# Patient Record
Sex: FEMALE | Race: WHITE | NOT HISPANIC OR LATINO | URBAN - METROPOLITAN AREA
[De-identification: names, ages, dates, MRNs, and addresses within clinical notes are randomized per-mention and may not be internally consistent; named-entity substitution may affect disease eponyms.]

---

## 2017-07-20 ENCOUNTER — EMERGENCY (EMERGENCY)
Facility: HOSPITAL | Age: 52
LOS: 0 days | Discharge: HOME | End: 2017-07-20
Attending: EMERGENCY MEDICINE | Admitting: EMERGENCY MEDICINE
Payer: COMMERCIAL

## 2017-07-20 VITALS — HEIGHT: 63 IN | WEIGHT: 132.94 LBS

## 2017-07-20 VITALS
RESPIRATION RATE: 18 BRPM | OXYGEN SATURATION: 100 % | HEART RATE: 80 BPM | SYSTOLIC BLOOD PRESSURE: 129 MMHG | DIASTOLIC BLOOD PRESSURE: 42 MMHG | TEMPERATURE: 99 F
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
## 2017-07-20 DIAGNOSIS — H57.8 OTHER SPECIFIED DISORDERS OF EYE AND ADNEXA: ICD-10-CM
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
## 2017-07-20 DIAGNOSIS — S00.11XA CONTUSION OF RIGHT EYELID AND PERIOCULAR AREA, INITIAL ENCOUNTER: ICD-10-CM

## 2017-07-20 PROCEDURE — 99283 EMERGENCY DEPT VISIT LOW MDM: CPT | Mod: 25

## 2017-07-20 RX ORDER — ERYTHROMYCIN BASE 5 MG/GRAM
1 OINTMENT (GRAM) OPHTHALMIC (EYE) ONCE
Qty: 0 | Refills: 0 | Status: DISCONTINUED | OUTPATIENT
Start: 2017-07-20 | End: 2017-07-20

## 2017-07-20 NOTE — ED STATDOCS - ATTENDING CONTRIBUTION TO CARE
Attending Contribution to Care: I, Arminda Magana, performed the initial face to face bedside interview with this patient regarding history of present illness, review of symptoms and relevant past medical, social and family history.  I completed an independent physical examination.  I was the initial provider who evaluated this patient. I have signed out the follow up of any pending tests (i.e. labs, radiological studies) to the ACP.  I have communicated the patient’s plan of care and disposition with the ACP.

## 2017-07-20 NOTE — ED ADULT TRIAGE NOTE - CHIEF COMPLAINT QUOTE
Patient reports pain to her right eye, denies trauma, negative for swelling , itching, discharge and redness. HX of HCL

## 2017-07-20 NOTE — ED STATDOCS - PROGRESS NOTE DETAILS
VA:  OD 20/25  OS 20/25 with corrective lenses. MICKEY Ravi:   Patient has been seen, evaluated and orders have been written by the attending in intake. Patient is stable.  I will follow up the results of orders written and I will continue to evaluate/observe the patient.  Christina Ravi PA-C

## 2017-07-20 NOTE — ED STATDOCS - OBJECTIVE STATEMENT
Pt. is a 51 yo F complaining of sudden onset of purple color to eyelid on right without known fall or injury.  Pt. states she was rubbing it and it seemed to get darker.  Pt. states her eye feels irritated and dry too.  No other complaints such as fever, headache, vomiting, or falls.  Denies change in vision such as double or blurry vision. No history of this happening in the past.

## 2017-07-20 NOTE — ED STATDOCS - MEDICAL DECISION MAKING DETAILS
Bruise likely on the soft tissue of eyelid, ointment to be applied in case of localized abrasion. Outpatient ophthalmology appt planned.

## 2017-07-28 PROBLEM — Z00.00 ENCOUNTER FOR PREVENTIVE HEALTH EXAMINATION: Status: ACTIVE | Noted: 2017-07-28

## 2017-11-08 ENCOUNTER — RESULT REVIEW (OUTPATIENT)
Age: 52
End: 2017-11-08

## 2018-10-31 ENCOUNTER — RESULT REVIEW (OUTPATIENT)
Age: 53
End: 2018-10-31

## 2018-11-19 ENCOUNTER — TRANSCRIPTION ENCOUNTER (OUTPATIENT)
Age: 53
End: 2018-11-19

## 2019-10-09 ENCOUNTER — RESULT REVIEW (OUTPATIENT)
Age: 54
End: 2019-10-09

## 2020-11-12 ENCOUNTER — RESULT REVIEW (OUTPATIENT)
Age: 55
End: 2020-11-12

## 2021-10-27 NOTE — ED STATDOCS - EYES, MLM
Breath Sounds equal & clear to percussion & auscultation, no accessory muscle use clear bilaterally.  Pupils equal, round, and reactive to light.  +punctate area of bruising of upper eyelid on the right.

## 2022-01-26 ENCOUNTER — NON-APPOINTMENT (OUTPATIENT)
Age: 57
End: 2022-01-26

## 2022-01-27 ENCOUNTER — APPOINTMENT (OUTPATIENT)
Dept: BREAST CENTER | Facility: CLINIC | Age: 57
End: 2022-01-27
Payer: COMMERCIAL

## 2022-01-27 VITALS
BODY MASS INDEX: 26.58 KG/M2 | DIASTOLIC BLOOD PRESSURE: 80 MMHG | HEART RATE: 77 BPM | WEIGHT: 150 LBS | HEIGHT: 63 IN | SYSTOLIC BLOOD PRESSURE: 131 MMHG

## 2022-01-27 DIAGNOSIS — Z80.8 FAMILY HISTORY OF MALIGNANT NEOPLASM OF OTHER ORGANS OR SYSTEMS: ICD-10-CM

## 2022-01-27 DIAGNOSIS — Z86.39 PERSONAL HISTORY OF OTHER ENDOCRINE, NUTRITIONAL AND METABOLIC DISEASE: ICD-10-CM

## 2022-01-27 DIAGNOSIS — Z80.7 FAMILY HISTORY OF OTHER MALIGNANT NEOPLASMS OF LYMPHOID, HEMATOPOIETIC AND RELATED TISSUES: ICD-10-CM

## 2022-01-27 DIAGNOSIS — Z78.9 OTHER SPECIFIED HEALTH STATUS: ICD-10-CM

## 2022-01-27 DIAGNOSIS — Z87.442 PERSONAL HISTORY OF URINARY CALCULI: ICD-10-CM

## 2022-01-27 DIAGNOSIS — Z87.42 PERSONAL HISTORY OF OTHER DISEASES OF THE FEMALE GENITAL TRACT: ICD-10-CM

## 2022-01-27 DIAGNOSIS — Z72.3 LACK OF PHYSICAL EXERCISE: ICD-10-CM

## 2022-01-27 DIAGNOSIS — Z80.6 FAMILY HISTORY OF LEUKEMIA: ICD-10-CM

## 2022-01-27 PROCEDURE — 99204 OFFICE O/P NEW MOD 45 MIN: CPT

## 2022-01-29 PROBLEM — Z80.8 FAMILY HISTORY OF MALIGNANT NEOPLASM OF BRAIN: Status: ACTIVE | Noted: 2022-01-27

## 2022-01-29 PROBLEM — Z87.42 HISTORY OF OVARIAN CYST: Status: RESOLVED | Noted: 2022-01-27 | Resolved: 2022-01-29

## 2022-01-29 PROBLEM — Z86.39 HISTORY OF HYPERCHOLESTEROLEMIA: Status: RESOLVED | Noted: 2022-01-27 | Resolved: 2022-01-29

## 2022-01-29 PROBLEM — Z72.3 DOES NOT EXERCISE: Status: ACTIVE | Noted: 2022-01-27

## 2022-01-29 PROBLEM — Z80.8 FAMILY HISTORY OF MALIGNANT NEOPLASM OF BONE: Status: ACTIVE | Noted: 2022-01-27

## 2022-01-29 PROBLEM — Z87.442 HISTORY OF KIDNEY STONES: Status: RESOLVED | Noted: 2022-01-27 | Resolved: 2022-01-29

## 2022-01-29 PROBLEM — Z80.6 FAMILY HISTORY OF LEUKEMIA: Status: ACTIVE | Noted: 2022-01-27

## 2022-01-29 PROBLEM — Z80.7 FAMILY HISTORY OF NON-HODGKIN'S LYMPHOMA: Status: ACTIVE | Noted: 2022-01-27

## 2022-01-29 PROBLEM — Z78.9 DOES NOT USE ILLICIT DRUGS: Status: ACTIVE | Noted: 2022-01-27

## 2022-01-29 RX ORDER — ZOLPIDEM TARTRATE 5 MG/1
5 TABLET, FILM COATED ORAL
Refills: 0 | Status: ACTIVE | COMMUNITY

## 2022-01-29 RX ORDER — ATORVASTATIN CALCIUM 20 MG/1
20 TABLET, FILM COATED ORAL
Refills: 0 | Status: ACTIVE | COMMUNITY

## 2022-01-29 NOTE — PHYSICAL EXAM
[Normocephalic] : normocephalic [Atraumatic] : atraumatic [Sclera nonicteric] : sclera nonicteric [No Supraclavicular Adenopathy] : no supraclavicular adenopathy [Clear to Auscultation Bilat] : clear to auscultation bilaterally [Normal Sinus Rhythm] : normal sinus rhythm [No Rubs] : no pericardial rub [Examined in the supine and seated position] : examined in the supine and seated position [Bra Size: ___] : Bra Size: [unfilled] [No dominant masses] : no dominant masses in right breast  [No dominant masses] : no dominant masses left breast [No Nipple Retraction] : no left nipple retraction [No Nipple Discharge] : no left nipple discharge [No Axillary Lymphadenopathy] : no left axillary lymphadenopathy [Soft] : abdomen soft [No Edema] : no edema [No Rashes] : no rashes [No Ulceration] : no ulceration

## 2022-01-29 NOTE — DATA REVIEWED
[FreeTextEntry1] : B/l mammogram 12/1/21\par - scattered fibroglandular density\par - BIRADS 1\par \par B/l complete US 12/1/21\par - R breast cyst\par - 0.5 cm nodule vs. complicated cyst in L breast 2:00 N5, new\par - BIRADS 3; 6 mo L US

## 2022-01-29 NOTE — PAST MEDICAL HISTORY
[Menarche Age ____] : age at menarche was [unfilled] [Total Preg ___] : G[unfilled] [Age At Live Birth ___] : Age at live birth: [unfilled] [FreeTextEntry8] : 1 mo

## 2022-01-29 NOTE — HISTORY OF PRESENT ILLNESS
[FreeTextEntry1] : Ms. Lira is a 56 year old woman who presents for a consultation for abnormal findings in her breast on imaging. She is asymptomatic. No palpable breast or axillary lumps, nipple discharge, or skin changes. \par \par Her family history is not significant for any breast cancer.

## 2022-01-29 NOTE — CONSULT LETTER
[Dear  ___] : Dear  [unfilled], [Consult Letter:] : I had the pleasure of evaluating your patient, [unfilled]. [Please see my note below.] : Please see my note below. [Consult Closing:] : Thank you very much for allowing me to participate in the care of this patient.  If you have any questions, please do not hesitate to contact me. [Sincerely,] : Sincerely, [FreeTextEntry3] : Delmis Levine MD FACS  [DrMilena  ___] : Dr. GERONIMO

## 2022-03-09 ENCOUNTER — RESULT REVIEW (OUTPATIENT)
Age: 57
End: 2022-03-09

## 2023-12-14 ENCOUNTER — APPOINTMENT (OUTPATIENT)
Dept: NEUROLOGY | Facility: CLINIC | Age: 58
End: 2023-12-14

## 2023-12-18 ENCOUNTER — APPOINTMENT (OUTPATIENT)
Dept: BREAST CENTER | Facility: CLINIC | Age: 58
End: 2023-12-18
Payer: COMMERCIAL

## 2023-12-18 VITALS
DIASTOLIC BLOOD PRESSURE: 84 MMHG | SYSTOLIC BLOOD PRESSURE: 146 MMHG | WEIGHT: 139 LBS | HEIGHT: 63 IN | BODY MASS INDEX: 24.63 KG/M2 | HEART RATE: 67 BPM

## 2023-12-18 DIAGNOSIS — R22.30 LOCALIZED SWELLING, MASS AND LUMP, UNSPECIFIED UPPER LIMB: ICD-10-CM

## 2023-12-18 PROCEDURE — 99214 OFFICE O/P EST MOD 30 MIN: CPT

## 2023-12-18 RX ORDER — OMEPRAZOLE MAGNESIUM 20 MG/1
20 CAPSULE, DELAYED RELEASE ORAL
Refills: 0 | Status: ACTIVE | COMMUNITY

## 2023-12-18 NOTE — PHYSICAL EXAM
[Normocephalic] : normocephalic [Atraumatic] : atraumatic [Sclera nonicteric] : sclera nonicteric [Supple] : supple [No Supraclavicular Adenopathy] : no supraclavicular adenopathy [No Cervical Adenopathy] : no cervical adenopathy [Clear to Auscultation Bilat] : clear to auscultation bilaterally [Normal Sinus Rhythm] : normal sinus rhythm [Examined in the supine and seated position] : examined in the supine and seated position [Bra Size: ___] : Bra Size: [unfilled] [No dominant masses] : no dominant masses in right breast  [No dominant masses] : no dominant masses left breast [No Nipple Retraction] : no left nipple retraction [No Nipple Discharge] : no left nipple discharge [No Axillary Lymphadenopathy] : no left axillary lymphadenopathy [No Edema] : no edema [No Rashes] : no rashes [No Ulceration] : no ulceration [de-identified] : No mass at area of concern in the low axilla

## 2023-12-18 NOTE — HISTORY OF PRESENT ILLNESS
[FreeTextEntry1] : Ms. Lira is a 58 year old woman who presents for a follow-up for abnormal findings in her breast on imaging. For awhile, she has noticed tenderness and a right in left axilla. No palpable breast lumps, nipple discharge, or skin changes.   Her family history is significant for breast cancer in her daughter (Rissa Lira, a patient of mine) at 32 whose genetic testing is pending.

## 2023-12-18 NOTE — DATA REVIEWED
[FreeTextEntry1] : I have independently reviewed the reports and the images.   B/l mammogram 12/12/23 - scattered fibroglandular density - BIRADS 1  B/l complete US 12/12/23 - 0.5 cm nodule in L breast 2:00 N8, stable since 2021 -  BIRADS 3; 6 mo L US - 0.5 cm nodule vs. complicated cyst in L breast 2:00 N5, new - BIRADS 3; 6 mo L US

## 2023-12-18 NOTE — CONSULT LETTER
[Dear  ___] : Dear  [unfilled], [Courtesy Letter:] : I had the pleasure of seeing your patient, [unfilled], in my office today. [Please see my note below.] : Please see my note below. [Consult Closing:] : Thank you very much for allowing me to participate in the care of this patient.  If you have any questions, please do not hesitate to contact me. [Sincerely,] : Sincerely, [FreeTextEntry3] : Delmis Levine MD FACS  [DrMilena  ___] : Dr. GERONIMO

## 2024-05-18 ENCOUNTER — OFFICE (OUTPATIENT)
Dept: URBAN - METROPOLITAN AREA CLINIC 12 | Facility: CLINIC | Age: 59
Setting detail: OPHTHALMOLOGY
End: 2024-05-18
Payer: COMMERCIAL

## 2024-05-18 DIAGNOSIS — H35.413: ICD-10-CM

## 2024-05-18 DIAGNOSIS — H01.001: ICD-10-CM

## 2024-05-18 DIAGNOSIS — H25.13: ICD-10-CM

## 2024-05-18 DIAGNOSIS — H01.005: ICD-10-CM

## 2024-05-18 DIAGNOSIS — H01.004: ICD-10-CM

## 2024-05-18 DIAGNOSIS — G45.9: ICD-10-CM

## 2024-05-18 DIAGNOSIS — H01.002: ICD-10-CM

## 2024-05-18 PROCEDURE — 92250 FUNDUS PHOTOGRAPHY W/I&R: CPT | Performed by: OPHTHALMOLOGY

## 2024-05-18 PROCEDURE — 92004 COMPRE OPH EXAM NEW PT 1/>: CPT | Performed by: OPHTHALMOLOGY

## 2024-05-18 ASSESSMENT — CONFRONTATIONAL VISUAL FIELD TEST (CVF)
OS_FINDINGS: FULL
OD_FINDINGS: FULL

## 2024-05-18 ASSESSMENT — LID EXAM ASSESSMENTS
OS_BLEPHARITIS: LLL LUL 1+
OD_BLEPHARITIS: RLL RUL 1+

## 2024-06-19 NOTE — PHYSICAL EXAM
[Normocephalic] : normocephalic [Atraumatic] : atraumatic [Sclera nonicteric] : sclera nonicteric [Supple] : supple [No Supraclavicular Adenopathy] : no supraclavicular adenopathy [No Cervical Adenopathy] : no cervical adenopathy [Clear to Auscultation Bilat] : clear to auscultation bilaterally [Normal Sinus Rhythm] : normal sinus rhythm [Examined in the supine and seated position] : examined in the supine and seated position [Bra Size: ___] : Bra Size: [unfilled] [No dominant masses] : no dominant masses in right breast  [No dominant masses] : no dominant masses left breast [No Nipple Retraction] : no left nipple retraction [No Nipple Discharge] : no left nipple discharge [No Axillary Lymphadenopathy] : no left axillary lymphadenopathy [No Edema] : no edema [No Rashes] : no rashes [No Ulceration] : no ulceration [de-identified] : No mass at area of concern in the low axilla

## 2024-06-20 ENCOUNTER — APPOINTMENT (OUTPATIENT)
Dept: BREAST CENTER | Facility: CLINIC | Age: 59
End: 2024-06-20
Payer: COMMERCIAL

## 2024-06-20 VITALS
HEART RATE: 61 BPM | WEIGHT: 146 LBS | HEIGHT: 63 IN | DIASTOLIC BLOOD PRESSURE: 82 MMHG | SYSTOLIC BLOOD PRESSURE: 124 MMHG | BODY MASS INDEX: 25.87 KG/M2

## 2024-06-20 DIAGNOSIS — Z12.39 ENCOUNTER FOR OTHER SCREENING FOR MALIGNANT NEOPLASM OF BREAST: ICD-10-CM

## 2024-06-20 DIAGNOSIS — M79.622 PAIN IN LEFT UPPER ARM: ICD-10-CM

## 2024-06-20 DIAGNOSIS — N63.0 UNSPECIFIED LUMP IN UNSPECIFIED BREAST: ICD-10-CM

## 2024-06-20 DIAGNOSIS — R92.8 OTHER ABNORMAL AND INCONCLUSIVE FINDINGS ON DIAGNOSTIC IMAGING OF BREAST: ICD-10-CM

## 2024-06-20 PROCEDURE — 99214 OFFICE O/P EST MOD 30 MIN: CPT

## 2024-06-20 RX ORDER — LOSARTAN POTASSIUM 100 MG/1
TABLET, FILM COATED ORAL
Refills: 0 | Status: ACTIVE | COMMUNITY

## 2024-06-20 NOTE — CONSULT LETTER
[Dear  ___] : Dear  [unfilled], [Courtesy Letter:] : I had the pleasure of seeing your patient, [unfilled], in my office today. [Please see my note below.] : Please see my note below. [Consult Closing:] : Thank you very much for allowing me to participate in the care of this patient.  If you have any questions, please do not hesitate to contact me. [Sincerely,] : Sincerely, [DrMilena  ___] : Dr. GERONIMO [FreeTextEntry3] : Delmis Levine MD FACS

## 2024-06-20 NOTE — HISTORY OF PRESENT ILLNESS
[FreeTextEntry1] : Ms. Lira is a 59 year old woman who presents for a follow-up for left breast nodules on US. She has no breast complaints. The left axillary tenderness is about the same. Her daughter Rissa just has one cycle of chemotherapy left.  Her family history is significant for breast cancer in her daughter (Rissa Lira, a patient of mine) at 32 whose genetic testing is pending.

## 2024-06-20 NOTE — ASSESSMENT
[FreeTextEntry1] : Ms. Lira is a 59 year old woman with left breast nodules on US and a family history of breast cancer. Her breast exam today is without suspicious findings. A left US is ordered for June for a 6 month follow-up of the left breast nodule. Her lifetime risk for breast cancer has been calculated and is less than 20%. I would like to see her back for a follow-up in 1 year. She understands and is comfortable with the plan. She is encouraged to call if any questions or concerns arise.

## 2024-06-20 NOTE — DATA REVIEWED
[FreeTextEntry1] : I have independently reviewed the reports and the images.   B/l mammogram 12/12/23 - scattered fibroglandular density - BIRADS 1  B/l complete US 12/12/23 - 0.5 cm nodule in L breast 2:00 N8, stable since 2021 - 0.5 cm nodule vs. complicated cyst in L breast 2:00 N5, new - BIRADS 3; 6 mo L US  L axillary US 12/21/23 - 1.5 cm L axillary node with normal morphology - BIRADS 2

## 2024-07-03 DIAGNOSIS — Z12.39 ENCOUNTER FOR OTHER SCREENING FOR MALIGNANT NEOPLASM OF BREAST: ICD-10-CM

## 2024-07-03 DIAGNOSIS — Z80.3 FAMILY HISTORY OF MALIGNANT NEOPLASM OF BREAST: ICD-10-CM

## 2024-07-03 DIAGNOSIS — N63.0 UNSPECIFIED LUMP IN UNSPECIFIED BREAST: ICD-10-CM

## 2024-08-08 ENCOUNTER — APPOINTMENT (OUTPATIENT)
Dept: NEUROSURGERY | Facility: CLINIC | Age: 59
End: 2024-08-08

## 2024-08-08 PROBLEM — M79.642 PAIN OF LEFT HAND: Status: ACTIVE | Noted: 2024-08-08

## 2024-08-08 PROBLEM — M54.2 CHRONIC NECK AND BACK PAIN: Status: ACTIVE | Noted: 2024-08-08

## 2024-08-08 PROCEDURE — 99204 OFFICE O/P NEW MOD 45 MIN: CPT

## 2024-08-08 NOTE — CONSULT LETTER
[Dear  ___] : Dear  [unfilled], [Courtesy Letter:] : I had the pleasure of seeing your patient, [unfilled], in my office today. [Sincerely,] : Sincerely, [FreeTextEntry2] : Horacio Duval  [facility] [address] [place], [address] [FreeTextEntry1] : Mrs. Lira is a very pleasant 59-year-old female patient who was seen in our office today to review imaging findings in the context of chronic neck and low back pain.  The patient endorses a 2 to 3-year history of chronic low back and neck pain.  The patient's neck pain is fairly consistent and worse with motions of the neck particularly with rotation to the right.  The patient endorses worsening of her pain throughout the day and improvement of her pain with recumbency.  The patient does not endorse any radiating symptoms down the length of her arm or leg.  The patient does not endorse numbness or paresthesias in the upper or lower extremities.  The patient does endorse pain in the fourth MIP joint hand.  This hand pain is intermittent and usually resolves spontaneously.  The patient endorses being under the significant amount of psychosocial stress as her daughter is currently undergoing medical treatments for an undisclosed condition.  The patient's low back pain is likely eccentric to the right and is not constant.  The patient endorses pain primarily with prolonged walking which resolves with rest.  The patient only recently started physical therapy for these issues.  The patient endorses a history of rheumatoid arthritis and peripheral vascular disease.  The patient denies any significant allergies.  The patient's current medical regimen includes Lipitor, Prilosec, losartan, Ambien, and aspirin.  On examination, the patient is alert, oriented, and compliant with the exam.  The patient demonstrates a slightly stooped posture but ambulates well.  The patient does not demonstrate any evidence of hyperreflexia.  The patient does not have a Yamileth sign or ankle clonus.  The patient can tandem walk without difficulty.  The patient demonstrates full strength in the lower extremities and upper extremities bilaterally.  The patient does not have any stigmata of small joint deformities.  The patient is accompanied with an MRI scan of the cervical spine dated May 22, 2024.  These images demonstrate multilevel degenerative changes most notably at C4-C7 where there is evidence of moderate stenosis centrally.  No ongoing cord compression or myelomalacia is noted.  Foraminal stenosis is most notable bilaterally at C4/5 and C5/6 on the right.  Mild foraminal stenosis is noted at C6/7 without ongoing nerve root compression.  These images appear similar to previous images from 2022. The patient is also accompanied with an MRI scan of the lumbar spine dated May 22, 2024.  These images demonstrate multilevel degenerative changes with disc bulges from L2-S1.  These disc bulges narrowed the lateral recesses in multiple regions of the lumbar spine possibly causing nerve root impingement particularly at L4/5.  Taken together, the patient has a clinical history and radiographic findings most consistent with musculoskeletal causes for neck and low back pain.  These issues have likely been significantly exacerbated by psychosocial stress.  The patient has been informed that her MRI scans of the cervical and lumbar spine do not reveal any surgical lesions responsible for her current pain.  However, the patient was informed that the cervical stenosis noted on her MRI scans could cause cervical neuropathy or radiculopathy symptoms and the patient's lumbar stenosis findings could cause neurogenic claudication or lumbar radicular pain.  At this time, the patient denies any of these issues but the patient has been advised to remain vigilant for these issues going forward and to return to our office should these issues arise.  We spent some time discussing the role of physical therapy and massage therapy in her clinical situation and I believe that stretching exercises with postural training of the cervical spine would provide significant relief for the patient long-term.  The patient has also been provided a pain management physician for the possibility of trigger point injections for acute pain management.  At this time, the patient will be following up with our office on an as-needed basis depending on her clinical progression. [FreeTextEntry3] : King Sanchez MD, PhD, FRCPSC   Attending Neurosurgeon  [address], [address] [place] [address]  Office: [phone]  Fax: [phone]

## 2024-09-03 ENCOUNTER — APPOINTMENT (OUTPATIENT)
Dept: PHYSICAL MEDICINE AND REHAB | Facility: CLINIC | Age: 59
End: 2024-09-03
Payer: COMMERCIAL

## 2024-09-03 VITALS
DIASTOLIC BLOOD PRESSURE: 87 MMHG | BODY MASS INDEX: 26.4 KG/M2 | HEART RATE: 80 BPM | HEIGHT: 63 IN | SYSTOLIC BLOOD PRESSURE: 149 MMHG | WEIGHT: 149 LBS | OXYGEN SATURATION: 100 %

## 2024-09-03 DIAGNOSIS — M79.10 MYALGIA, UNSPECIFIED SITE: ICD-10-CM

## 2024-09-03 DIAGNOSIS — M43.06 SPONDYLOLYSIS, LUMBAR REGION: ICD-10-CM

## 2024-09-03 DIAGNOSIS — M47.816 SPONDYLOSIS W/OUT MYELOPATHY OR RADICULOPATHY, LUMBAR REGION: ICD-10-CM

## 2024-09-03 DIAGNOSIS — M47.812 SPONDYLOSIS W/OUT MYELOPATHY OR RADICULOPATHY, CERVICAL REGION: ICD-10-CM

## 2024-09-03 PROCEDURE — 20553 NJX 1/MLT TRIGGER POINTS 3/>: CPT

## 2024-09-03 PROCEDURE — 99204 OFFICE O/P NEW MOD 45 MIN: CPT | Mod: 25

## 2024-09-03 NOTE — HISTORY OF PRESENT ILLNESS
[FreeTextEntry1] : Ms. ARGELIA BHAT  is a 59 year old female with a PMHx of RA, PVD who presents with neck and low back pain.   Location:R side of Neck and across low back Onset: Chronic, started around 2021, no inciting event Provocation/Palliative: Worse with R cervical rotation, activity Quality:Achy, spasm Radiation: None Severity:6-7/10 on average, can be 9/10 Timing: Gradually worsening  Denies any associated numbness. Denies any associated arm or hand weakness. Denies any loss of bowel/bladder control or any groin numbness. Previous medications trialed: Motrin with minimal relief Previous procedures relevant to complaint: ?PETRA in neck, TPIs in low back Has tried conservative treatment?: Currently in PT, for at least 2-3 months

## 2024-09-03 NOTE — ASSESSMENT
[FreeTextEntry1] : Ms. ARGELIA BHAT is a 59 year old female who presents with chronic neck and low back pain, likely a combination of spondylosis and myofascial based pain. Denies any red flag signs. Will recommend: - MR APRIL Spine reviewed - TPI performed today, tolerated well - Discussed the R/B/A of a medrol dose julianne for which she would like to hold off for now - We also discussed acupuncture and massage therapy  RTC in 4-6 weeks. Patient aware of red flag signs including any changes to their bowel/bladder control, groin numbness or new weakness. Patient knows to seek immediate attention by calling 911 or going to nearest ER if these symptoms appear.   This patient is being managed for a complex chronic pain that requires ongoing medical management. The nature of this condition requires a longitudinal relationship and monitoring over time for appropriate treatment.

## 2024-09-03 NOTE — PROCEDURE
[de-identified] : Reason for procedure: Myalgia  Procedure: Trigger Point Injections Physician: Dr. Corona Medication injected: Lidocaine 1%, 5cc total Sedation medications: None Estimated blood loss: None Complications: None  Technique: R/B/A to trigger point injection reviewed with patient. The patient is agreeable and wishes to proceed.   The patient was placed in the seated position and trigger points of the right cervical paraspinals,  trapezius, lumbar paraspinals were identified. The area was prepped in normal sterile fashion with Chloroprep. A 27 gauge 1.25 inch needle was advanced into the palpable trigger points with reproduction of pain. After negative aspiration of heme, the above medications were injected into the trigger areas. Needle was then removed, bandaid placed over injection sites. There was no complications, the patient was provided with post injection instructions.

## 2024-09-03 NOTE — DATA REVIEWED
[FreeTextEntry1] : MR C Spine [facility] radiology 5/2024 reviewed and interpreted by me: multilevel spondylosis, impingement of R C6 nerve root seen  MR L Spine 5/2024 Z radiology reviewed and interpreted by me: multilevel spondylosis

## 2024-09-03 NOTE — PHYSICAL EXAM
[FreeTextEntry1] : PE: Constitutional: In NAD, calm and cooperative MSK (Neck and Back) 	Inspection: no gross swelling identified 	Palpation: Tenderness of the Rlower cervical paraspinals, trapezius and bilaterael lumbar paraspinals 	ROM: Pain at end cervical extension/flexion and with lumbar extension/flexion 	Strength: 5/5 strength in bilateral upper and lower extremities 	Reflexes: 2+ Biceps/Brachioradialis/Triceps/patella/Achilles reflex bilaterally, Hoffmans/Clonus negative bilaterally 	Sensation: Intact to light touch in bilateral upper and lower extremities 	Special tests: Spurlings test negative bilaterally, SLR negative bilaterally, STEPHANIE/FADIR negative bilaterally

## 2024-09-04 RX ORDER — METHOCARBAMOL 500 MG/1
500 TABLET, FILM COATED ORAL
Qty: 30 | Refills: 0 | Status: ACTIVE | COMMUNITY
Start: 2024-09-04 | End: 1900-01-01

## 2024-09-18 ENCOUNTER — TRANSCRIPTION ENCOUNTER (OUTPATIENT)
Age: 59
End: 2024-09-18

## 2024-10-01 ENCOUNTER — APPOINTMENT (OUTPATIENT)
Dept: PHYSICAL MEDICINE AND REHAB | Facility: CLINIC | Age: 59
End: 2024-10-01
Payer: COMMERCIAL

## 2024-10-01 VITALS
BODY MASS INDEX: 25.87 KG/M2 | HEIGHT: 63 IN | HEART RATE: 75 BPM | DIASTOLIC BLOOD PRESSURE: 83 MMHG | OXYGEN SATURATION: 99 % | WEIGHT: 146 LBS | SYSTOLIC BLOOD PRESSURE: 136 MMHG

## 2024-10-01 DIAGNOSIS — M47.816 SPONDYLOSIS W/OUT MYELOPATHY OR RADICULOPATHY, LUMBAR REGION: ICD-10-CM

## 2024-10-01 DIAGNOSIS — M47.812 SPONDYLOSIS W/OUT MYELOPATHY OR RADICULOPATHY, CERVICAL REGION: ICD-10-CM

## 2024-10-01 DIAGNOSIS — M54.2 CERVICALGIA: ICD-10-CM

## 2024-10-01 DIAGNOSIS — G89.29 CERVICALGIA: ICD-10-CM

## 2024-10-01 DIAGNOSIS — M54.9 CERVICALGIA: ICD-10-CM

## 2024-10-01 PROCEDURE — 99214 OFFICE O/P EST MOD 30 MIN: CPT

## 2024-10-01 PROCEDURE — G2211 COMPLEX E/M VISIT ADD ON: CPT | Mod: NC

## 2024-10-01 RX ORDER — METHYLPREDNISOLONE 4 MG/1
4 TABLET ORAL
Qty: 1 | Refills: 0 | Status: ACTIVE | COMMUNITY
Start: 2024-10-01 | End: 1900-01-01

## 2024-10-01 NOTE — HISTORY OF PRESENT ILLNESS
[FreeTextEntry1] : Ms. ARGELIA BHAT is a 59 year old female who presents for follow up. At last visit, she was given a TPI, discussed a medrol dose julianne and discussed acupuncture/massage therapy. The TPIs did help for about 7-10 days. She is doing PT with some relief but still has continued pain in the back and neck.    Location:R side of Neck and across low back Onset: Chronic, started around 2021, no inciting event Provocation/Palliative: Worse with R cervical rotation, activity Quality:Achy, spasm Radiation: None Severity:6-7/10 on average, can be 9/10 Timing: Gradually worsening  No bowel/bladder changes. No groin numbness.

## 2024-10-01 NOTE — ASSESSMENT
[FreeTextEntry1] : Ms. ARGELIA BHAT is a 59 year old female who presents with chronic neck and low back pain, likely a combination of spondylosis and myofascial based pain. Denies any red flag signs. Will recommend: - MR C/L Spine reviewed - TPI performed today, tolerated well - Medrol dose julianne Rx given, to take as recommended. Patient advised on potential side effects including but not limited to increased risk of elevated blood sugar, blood pressure, and infection. Patient encouraged to take medication with food and not with NSAIDs.  - Discussed R/B/A of interventions such as cervical/lumbar MBB/RFAs for which she will see how she does with the medrol dose julianne first.  RTC in 2 weeks. Patient aware of red flag signs including any changes to their bowel/bladder control, groin numbness or new weakness. Patient knows to seek immediate attention by calling 911 or going to nearest ER if these symptoms appear.  This patient is being managed for a complex chronic pain that requires ongoing medical management. The nature of this condition requires a longitudinal relationship and monitoring over time for appropriate treatment.

## 2024-10-01 NOTE — PHYSICAL EXAM
[FreeTextEntry1] : PE: Constitutional: In NAD, calm and cooperative MSK (Neck and Back)  Inspection: no gross swelling identified  Palpation: Tenderness of the R lower cervical paraspinals, trapezius and bilaterael lumbar paraspinals  ROM: Pain at end cervical extension/flexion and with lumbar extension/flexion  Strength: 5/5 strength in bilateral upper and lower extremities  Reflexes: 2+ Biceps/Brachioradialis/Triceps/patella/Achilles reflex bilaterally, Hoffmans/Clonus negative bilaterally  Sensation: Intact to light touch in bilateral upper and lower extremities  Special tests: Spurlings test negative bilaterally, SLR negative bilaterally, STEPHANIE/FADIR negative bilaterally.

## 2024-11-07 ENCOUNTER — INPATIENT (INPATIENT)
Facility: HOSPITAL | Age: 59
LOS: 0 days | Discharge: HOME | DRG: 103 | End: 2024-11-08
Attending: STUDENT IN AN ORGANIZED HEALTH CARE EDUCATION/TRAINING PROGRAM | Admitting: INTERNAL MEDICINE
Payer: COMMERCIAL

## 2024-11-07 ENCOUNTER — RESULT REVIEW (OUTPATIENT)
Age: 59
End: 2024-11-07

## 2024-11-07 ENCOUNTER — TRANSCRIPTION ENCOUNTER (OUTPATIENT)
Age: 59
End: 2024-11-07

## 2024-11-07 VITALS
OXYGEN SATURATION: 99 % | WEIGHT: 153.22 LBS | HEART RATE: 97 BPM | SYSTOLIC BLOOD PRESSURE: 171 MMHG | TEMPERATURE: 97 F | RESPIRATION RATE: 18 BRPM | DIASTOLIC BLOOD PRESSURE: 100 MMHG

## 2024-11-07 DIAGNOSIS — I63.9 CEREBRAL INFARCTION, UNSPECIFIED: ICD-10-CM

## 2024-11-07 LAB
ALBUMIN SERPL ELPH-MCNC: 3.9 G/DL — SIGNIFICANT CHANGE UP (ref 3.3–5)
ALP SERPL-CCNC: 69 U/L — SIGNIFICANT CHANGE UP (ref 40–120)
ALT FLD-CCNC: 32 U/L — SIGNIFICANT CHANGE UP (ref 12–78)
ANION GAP SERPL CALC-SCNC: 3 MMOL/L — LOW (ref 5–17)
APTT BLD: 31.8 SEC — SIGNIFICANT CHANGE UP (ref 24.5–35.6)
AST SERPL-CCNC: 24 U/L — SIGNIFICANT CHANGE UP (ref 15–37)
BASOPHILS # BLD AUTO: 0.04 K/UL — SIGNIFICANT CHANGE UP (ref 0–0.2)
BASOPHILS NFR BLD AUTO: 0.8 % — SIGNIFICANT CHANGE UP (ref 0–2)
BILIRUB SERPL-MCNC: 0.6 MG/DL — SIGNIFICANT CHANGE UP (ref 0.2–1.2)
BUN SERPL-MCNC: 14 MG/DL — SIGNIFICANT CHANGE UP (ref 7–23)
CALCIUM SERPL-MCNC: 9.5 MG/DL — SIGNIFICANT CHANGE UP (ref 8.5–10.1)
CHLORIDE SERPL-SCNC: 109 MMOL/L — HIGH (ref 96–108)
CO2 SERPL-SCNC: 30 MMOL/L — SIGNIFICANT CHANGE UP (ref 22–31)
CREAT SERPL-MCNC: 0.62 MG/DL — SIGNIFICANT CHANGE UP (ref 0.5–1.3)
EGFR: 101 ML/MIN/1.73M2 — SIGNIFICANT CHANGE UP
EOSINOPHIL # BLD AUTO: 0.13 K/UL — SIGNIFICANT CHANGE UP (ref 0–0.5)
EOSINOPHIL NFR BLD AUTO: 2.5 % — SIGNIFICANT CHANGE UP (ref 0–6)
GLUCOSE SERPL-MCNC: 109 MG/DL — HIGH (ref 70–99)
HCT VFR BLD CALC: 37.4 % — SIGNIFICANT CHANGE UP (ref 34.5–45)
HGB BLD-MCNC: 12.9 G/DL — SIGNIFICANT CHANGE UP (ref 11.5–15.5)
IMM GRANULOCYTES NFR BLD AUTO: 0.4 % — SIGNIFICANT CHANGE UP (ref 0–0.9)
INR BLD: 0.96 RATIO — SIGNIFICANT CHANGE UP (ref 0.85–1.16)
LYMPHOCYTES # BLD AUTO: 1.92 K/UL — SIGNIFICANT CHANGE UP (ref 1–3.3)
LYMPHOCYTES # BLD AUTO: 37.2 % — SIGNIFICANT CHANGE UP (ref 13–44)
MCHC RBC-ENTMCNC: 30.1 PG — SIGNIFICANT CHANGE UP (ref 27–34)
MCHC RBC-ENTMCNC: 34.5 G/DL — SIGNIFICANT CHANGE UP (ref 32–36)
MCV RBC AUTO: 87.2 FL — SIGNIFICANT CHANGE UP (ref 80–100)
MONOCYTES # BLD AUTO: 0.49 K/UL — SIGNIFICANT CHANGE UP (ref 0–0.9)
MONOCYTES NFR BLD AUTO: 9.5 % — SIGNIFICANT CHANGE UP (ref 2–14)
NEUTROPHILS # BLD AUTO: 2.56 K/UL — SIGNIFICANT CHANGE UP (ref 1.8–7.4)
NEUTROPHILS NFR BLD AUTO: 49.6 % — SIGNIFICANT CHANGE UP (ref 43–77)
PLATELET # BLD AUTO: 289 K/UL — SIGNIFICANT CHANGE UP (ref 150–400)
POTASSIUM SERPL-MCNC: 3.9 MMOL/L — SIGNIFICANT CHANGE UP (ref 3.5–5.3)
POTASSIUM SERPL-SCNC: 3.9 MMOL/L — SIGNIFICANT CHANGE UP (ref 3.5–5.3)
PROT SERPL-MCNC: 7.2 GM/DL — SIGNIFICANT CHANGE UP (ref 6–8.3)
PROTHROM AB SERPL-ACNC: 11.1 SEC — SIGNIFICANT CHANGE UP (ref 9.9–13.4)
RBC # BLD: 4.29 M/UL — SIGNIFICANT CHANGE UP (ref 3.8–5.2)
RBC # FLD: 12.7 % — SIGNIFICANT CHANGE UP (ref 10.3–14.5)
SODIUM SERPL-SCNC: 142 MMOL/L — SIGNIFICANT CHANGE UP (ref 135–145)
TROPONIN I, HIGH SENSITIVITY RESULT: 4.73 NG/L — SIGNIFICANT CHANGE UP
WBC # BLD: 5.16 K/UL — SIGNIFICANT CHANGE UP (ref 3.8–10.5)
WBC # FLD AUTO: 5.16 K/UL — SIGNIFICANT CHANGE UP (ref 3.8–10.5)

## 2024-11-07 PROCEDURE — 80061 LIPID PANEL: CPT

## 2024-11-07 PROCEDURE — 36415 COLL VENOUS BLD VENIPUNCTURE: CPT

## 2024-11-07 PROCEDURE — 99223 1ST HOSP IP/OBS HIGH 75: CPT

## 2024-11-07 PROCEDURE — 92610 EVALUATE SWALLOWING FUNCTION: CPT | Mod: GN

## 2024-11-07 PROCEDURE — 70551 MRI BRAIN STEM W/O DYE: CPT | Mod: MC

## 2024-11-07 PROCEDURE — 93010 ELECTROCARDIOGRAM REPORT: CPT

## 2024-11-07 PROCEDURE — 70496 CT ANGIOGRAPHY HEAD: CPT | Mod: 26,MC

## 2024-11-07 PROCEDURE — 97116 GAIT TRAINING THERAPY: CPT | Mod: GP

## 2024-11-07 PROCEDURE — 70551 MRI BRAIN STEM W/O DYE: CPT | Mod: 26

## 2024-11-07 PROCEDURE — 70450 CT HEAD/BRAIN W/O DYE: CPT | Mod: 26,59,MC

## 2024-11-07 PROCEDURE — 92523 SPEECH SOUND LANG COMPREHEN: CPT | Mod: GN

## 2024-11-07 PROCEDURE — 99253 IP/OBS CNSLTJ NEW/EST LOW 45: CPT

## 2024-11-07 PROCEDURE — 0042T: CPT | Mod: MC

## 2024-11-07 PROCEDURE — 83036 HEMOGLOBIN GLYCOSYLATED A1C: CPT

## 2024-11-07 PROCEDURE — 99291 CRITICAL CARE FIRST HOUR: CPT

## 2024-11-07 PROCEDURE — 97535 SELF CARE MNGMENT TRAINING: CPT | Mod: GO

## 2024-11-07 PROCEDURE — 93306 TTE W/DOPPLER COMPLETE: CPT | Mod: 26

## 2024-11-07 PROCEDURE — 97161 PT EVAL LOW COMPLEX 20 MIN: CPT | Mod: GP

## 2024-11-07 PROCEDURE — 80048 BASIC METABOLIC PNL TOTAL CA: CPT

## 2024-11-07 PROCEDURE — 70498 CT ANGIOGRAPHY NECK: CPT | Mod: 26,MC

## 2024-11-07 RX ORDER — PANTOPRAZOLE SODIUM 40 MG/1
40 TABLET, DELAYED RELEASE ORAL
Refills: 0 | Status: DISCONTINUED | OUTPATIENT
Start: 2024-11-07 | End: 2024-11-08

## 2024-11-07 RX ORDER — ONDANSETRON HYDROCHLORIDE 2 MG/ML
4 INJECTION, SOLUTION INTRAMUSCULAR; INTRAVENOUS ONCE
Refills: 0 | Status: COMPLETED | OUTPATIENT
Start: 2024-11-07 | End: 2024-11-07

## 2024-11-07 RX ORDER — ZOLPIDEM TARTRATE 10 MG
1 TABLET ORAL
Refills: 0 | DISCHARGE

## 2024-11-07 RX ORDER — ASPIRIN/MAG CARB/ALUMINUM AMIN 325 MG
81 TABLET ORAL ONCE
Refills: 0 | Status: COMPLETED | OUTPATIENT
Start: 2024-11-07 | End: 2024-11-07

## 2024-11-07 RX ORDER — ASPIRIN/MAG CARB/ALUMINUM AMIN 325 MG
81 TABLET ORAL DAILY
Refills: 0 | Status: DISCONTINUED | OUTPATIENT
Start: 2024-11-07 | End: 2024-11-08

## 2024-11-07 RX ORDER — ACETAMINOPHEN 500 MG
650 TABLET ORAL EVERY 6 HOURS
Refills: 0 | Status: DISCONTINUED | OUTPATIENT
Start: 2024-11-07 | End: 2024-11-08

## 2024-11-07 RX ORDER — ZOLPIDEM TARTRATE 10 MG
5 TABLET ORAL AT BEDTIME
Refills: 0 | Status: DISCONTINUED | OUTPATIENT
Start: 2024-11-07 | End: 2024-11-08

## 2024-11-07 RX ORDER — LOSARTAN POTASSIUM 25 MG/1
1 TABLET ORAL
Refills: 0 | DISCHARGE

## 2024-11-07 RX ORDER — ONDANSETRON HYDROCHLORIDE 2 MG/ML
4 INJECTION, SOLUTION INTRAMUSCULAR; INTRAVENOUS EVERY 6 HOURS
Refills: 0 | Status: DISCONTINUED | OUTPATIENT
Start: 2024-11-07 | End: 2024-11-08

## 2024-11-07 RX ORDER — OMEPRAZOLE 10 MG
1 CAPSULE,DELAYED RELEASE (ENTERIC COATED) ORAL
Refills: 0 | DISCHARGE

## 2024-11-07 RX ORDER — LOSARTAN POTASSIUM 25 MG/1
25 TABLET ORAL DAILY
Refills: 0 | Status: DISCONTINUED | OUTPATIENT
Start: 2024-11-07 | End: 2024-11-08

## 2024-11-07 RX ORDER — ENOXAPARIN SODIUM 40MG/0.4ML
40 SYRINGE (ML) SUBCUTANEOUS EVERY 24 HOURS
Refills: 0 | Status: DISCONTINUED | OUTPATIENT
Start: 2024-11-07 | End: 2024-11-08

## 2024-11-07 RX ADMIN — Medication 81 MILLIGRAM(S): at 11:14

## 2024-11-07 RX ADMIN — Medication 80 MILLIGRAM(S): at 22:14

## 2024-11-07 RX ADMIN — Medication 81 MILLIGRAM(S): at 06:34

## 2024-11-07 RX ADMIN — Medication 650 MILLIGRAM(S): at 17:00

## 2024-11-07 RX ADMIN — ONDANSETRON HYDROCHLORIDE 4 MILLIGRAM(S): 2 INJECTION, SOLUTION INTRAMUSCULAR; INTRAVENOUS at 06:17

## 2024-11-07 RX ADMIN — LOSARTAN POTASSIUM 25 MILLIGRAM(S): 25 TABLET ORAL at 11:13

## 2024-11-07 RX ADMIN — Medication 650 MILLIGRAM(S): at 17:41

## 2024-11-07 RX ADMIN — Medication 40 MILLIGRAM(S): at 11:14

## 2024-11-07 NOTE — SWALLOW BEDSIDE ASSESSMENT ADULT - SWALLOW EVAL: CRITERIA FOR SKILLED INTERVENTION MET
DO NOT FEEL THAT ACUTE SPEECH PATHOLOGY FOLLOW UP WOULD CHANGE CLINICAL MANAGEMENT/OUTCOME IN HOSPITAL SETTING. PT'S SPEECH-LANGUAGE AND OROPHARYNGEAL SWALLOWING INTEGRITY ARE FUNCTIONAL/AT BASELINE/MAXIMIZED. GIVEN ABOVE, THIS SERVICE WILL NOT ACTIVELY FOLLOW. RECONSULT PRN SHOULD STATUS CHANGER AND CONDITION WARRANT.

## 2024-11-07 NOTE — CONSULT NOTE ADULT - ASSESSMENT
59 yr old woman c/o headache, non focal exam. Ct head, CT angios negative. ? migraine headache, MRI head pending. On asa, statin.  Suggest:  analgesics as needed  f/u tele  f/u MR head  2D ECHO  check lipids, HGA1C

## 2024-11-07 NOTE — CONSULT NOTE ADULT - SUBJECTIVE AND OBJECTIVE BOX
Neurology Consult requested by:   Patient is a 59y old  Female who presents with a chief complaint of TIA (07 Nov 2024 09:54)     HPI:  58 yo RH woman presented to the ED c/o headache, feeling generally weak. Still has headache today, lightheaded, nauseous, started around 4 AM day of admission, took some asa, wo improvement. Denies LOV, slurred speech, unilateral weakness or numbness of the face or extremities.   No hx of migraines, occasional "tension type headaches". Has noted increased stress at work, dx with HTN.    PAST MEDICAL & SURGICAL HISTORY:  Transient ischemic attack (TIA)      HTN (hypertension)      HLD (hyperlipidemia)      No significant past surgical history        FAMILY HISTORY:  No pertinent family history in first degree relatives      Social Hx:  Nonsmoker, no drug or alcohol use  Medications and Allergies ReviewedMEDICATIONS  (STANDING):  aspirin enteric coated 81 milliGRAM(s) Oral daily  atorvastatin 80 milliGRAM(s) Oral at bedtime  enoxaparin Injectable 40 milliGRAM(s) SubCutaneous every 24 hours  losartan 25 milliGRAM(s) Oral daily  pantoprazole    Tablet 40 milliGRAM(s) Oral before breakfast     ROS: Pertinent positives in HPI, all other ROS were reviewed and are negative.      Examination:   Vital Signs Last 24 Hrs  T(C): 36.8 (07 Nov 2024 09:39), Max: 36.8 (07 Nov 2024 09:39)  T(F): 98.2 (07 Nov 2024 09:39), Max: 98.2 (07 Nov 2024 09:39)  HR: 70 (07 Nov 2024 11:10) (69 - 97)  BP: 145/85 (07 Nov 2024 11:10) (135/88 - 171/100)  BP(mean): 101 (07 Nov 2024 06:50) (101 - 104)  RR: 18 (07 Nov 2024 09:39) (16 - 18)  SpO2: 100% (07 Nov 2024 09:39) (99% - 100%)    Parameters below as of 07 Nov 2024 09:39  Patient On (Oxygen Delivery Method): room air      General: Cooperative, NAD   NECK: supple, no masses  ENT: Normal hearing   Vascular : no carotid bruits,   Lungs: CTAB  Chest: RRR, no murmurs  Extremities: nontender, no edema  Musculoskeletal: no adventitious movements, no joint stiffness  Skin: no rash    Neurological Examination:  NIHSS:0  MS: AOx3. Appropriately interactive, normal affect. Speech fluent w/o paraphasic error, repetition, naming, reading is intact   CN: VFFTC, PERLL, EOMI, V1-3 sensation intact, face symmetric, hearing intact, palate elevates symmetrically, tongue midline, SCM equal bilaterally  Motor: normal bulk and tone, no tremor, rigidity or bradykinesia.  No drift, strength 5/5 all over   Sens: Intact to light touch, DSS.    Reflexes: 1-2/4 all over, downgoing toes b/l  Coord:  No dysmetria, BERNADETTE intact   Gait: Cannot test    Labs: Reviewed  Comprehensive Metabolic Panel (11.07.24 @ 05:49)   Sodium: 142 mmol/L  Potassium: 3.9 mmol/L  Chloride: 109 mmol/L  Carbon Dioxide: 30 mmol/L  Anion Gap: 3 mmol/L  Blood Urea Nitrogen: 14 mg/dL  Creatinine: 0.62 mg/dL  Glucose: 109 mg/dL  Calcium: 9.5 mg/dL  Protein Total: 7.2 gm/dL  Albumin: 3.9 g/dL  Bilirubin Total: 0.6 mg/dL  Alkaline Phosphatase: 69 U/L  Aspartate Aminotransferase (AST/SGOT): 24 U/L  Alanine Aminotransferase (ALT/SGPT): 32 U/L  eGFR: 101:    Complete Blood Count + Automated Diff (11.07.24 @ 05:49)   WBC Count: 5.16 K/uL  RBC Count: 4.29 M/uL  Hemoglobin: 12.9 g/dL  Hematocrit: 37.4 %  Mean Cell Volume: 87.2 fl  Mean Cell Hemoglobin: 30.1 pg  Mean Cell Hemoglobin Conc: 34.5 g/dL  Red Cell Distrib Width: 12.7 %  Platelet Count - Automated: 289 K/uL      < from: CT Brain Perfusion Maps Stroke (11.07.24 @ 06:09) >    IMPRESSION:    CT HEAD:  No acute intracranial hemorrhage or mass effect.    Findings were discussed with Dr. ARMINDA CERNA [phone] 11/7/2024   6:17 AM by Dr. Ko with read back confirmation.    CT PERFUSION:  Technical limitations: None.    Core infarction: 0 ml  Penumbra / tissue at risk for active ischemia: 0 ml    CTA NECK:  No evidence of significant stenosis or occlusion.    CTA HEAD:  No large vessel occlusion, significant stenosis or vascular abnormality   identified.    < end of copied text >

## 2024-11-07 NOTE — ED ADULT TRIAGE NOTE - CHIEF COMPLAINT QUOTE
patient ambulatory to triage complains of waking up at 0430 with headache and high blood pressure. reports BP of 160/102 at home. took tylenol for headache with no relief. takes losartan daily. reports history of TIA last year. alert and oriented at triage, answering questions appropriately. patient ambulatory to triage complains of waking up at 0430 with headache and high blood pressure. reports BP of 160/102 at home. took tylenol for headache with no relief. takes losartan daily. reports history of TIA last year. alert and oriented at triage, answering questions appropriately.slight left sided facial , patient reports left sided weakness patient ambulatory to triage complains of waking up at 0430 with headache and high blood pressure. reports BP of 160/102 at home. took tylenol for headache with no relief. takes losartan daily. reports history of TIA last year. alert and oriented at triage, answering questions appropriately. slight left sided facial droop , patient reports generalized weakness, left side worse than right that started while patient ambulatory to triage complains of waking up at 0430 with headache and high blood pressure. reports BP of 160/102 at home. took tylenol for headache with no relief. takes losartan daily. reports history of TIA last year. alert and oriented at triage, answering questions appropriately. slight left sided facial droop , patient reports generalized weakness, left side worse than right that started while on way to hospital. MD samayoa at triage, code stroke called at 0546.

## 2024-11-07 NOTE — PHYSICAL THERAPY INITIAL EVALUATION ADULT - PERTINENT HX OF CURRENT PROBLEM, REHAB EVAL
60 yo Female presented to the ED with complain of left sided weakness. She woke up 0430 am  with headache and high blood pressure. She reported BP of 160/102 at home. Took tylenol for headache with no relief. She had TIA last year also. She had slight left sided facial droop.  Patient reports generalized weakness, left side worse than right that started while on way to hospital. By the time she arrived in hospital her weakness was improved. 60 yo Female presented to the ED with complain of left sided weakness. She woke up 0430 am  with headache and high blood pressure. She reported BP of 160/102 at home. Took tynelol for headache with no relief. She had TIA last year also. She had slight left sided facial droop.  Patient reports generalized weakness, left side worse than right that started while on way to hospital. By the time she arrived in hospital her weakness was improved. But still feels left side weaker than rt

## 2024-11-07 NOTE — PATIENT PROFILE ADULT - NSPROPTRIGHTSUPPORTPERSON_GEN_A_NUR
Diabetes Care Specialist Progress Note  Author: Chantal Shrestha RD, CDE  Date: 5/30/2023    Program Intake  Reason for Diabetes Program Visit:: Intervention  Type of Intervention:: Individual  Individual: Education  Education: Self-Management Skill Review  Current diabetes risk level:: moderate  In the last 12 months, have you:: none    Lab Results   Component Value Date    HGBA1C 8.2 (H) 03/01/2023         Diabetes Self-Management Skills Assessment    Medications  Diabetes management routine:: insulin, injectable medications, oral medications, diet (Humalog 10 units + scale AC, Levemir 55 units BID, Ozempic 1mg weekly. metformin 500mg BID)  Patient is able to identify current diabetes medications, dosages, and appropriate timing of medications.: yes  Patient understands the purpose of the medications taken for diabetes.: yes  Patient reports problems or concerns with current medication regimen.: no  Medication regimen problems/concerns:: other (see comments) (dexcom trend shows glucose remaining above goal.)  Medication Skills Assessment Completed:: Yes  Assessment indicates:: Adequate understanding, Instruction Needed  Area of need?: Yes    Home Blood Glucose Monitoring  Patient states that blood sugar is checked at home daily.: yes  Monitoring Method:: personal continuous glucose monitor  Personal CGM type:: Dexcom G6  Patient is able to use personal CGM appropriately.: yes  CGM Report reviewed?: yes  Home Blood Glucose Monitoring Skills Assessment Completed: : Yes  Assessment indicates:: Adequate understanding  Area of need?: No       Assessment Summary and Plan    Based on today's diabetes care assessment, the following areas of need were identified:      Social 11/9/2022   Access to Mass Media/Tech No   Cognitive/Behavioral Health No   Culture/Mu-ism No   Communication No   Health Literacy No        Clinical 11/9/2022   Medication Adherence Yes   Lab Compliance No        Diabetes Self-Management Skills  5/24/2023   Diabetes Disease Process/Treatment Options -   Nutrition/Healthy Eating -   Physical Activity/Exercise -   Medication Yes - see care planning   Home Blood Glucose Monitoring No   Acute Complications -   Chronic Complications -   Psychosocial/Coping -          Today's interventions were provided through individual discussion, instruction, and written materials were provided.      Patient verbalized understanding of instruction and written materials.  Pt was able to return back demonstration of instructions today. Patient understood key points, needs reinforcement and further instruction.     Diabetes Self-Management Care Plan:    Today's Diabetes Self-Management Care Plan was developed with Starla SHANKAR's input. Starla B has agreed to work toward the following goal(s) to improve his/her overall diabetes control.      Care Plan: Diabetes Management   Updates made since 4/30/2023 12:00 AM        Problem: Medications         Goal: Patient Agrees to take Diabetes Medication(s) - Ozempic, Metformin, and MDI as prescribed. Completed 5/24/2023   Start Date: 11/10/2022   Expected End Date: 12/15/2022   Recent Progress: Met   Priority: High   Barriers: No Barriers Identified   Note:    11/9/22 - Pt has started Ozempic but sometimes missing insulin doses. Reviewed importance of taking Humalog before each meal (and sometimes her dinner meal is a bunch of snack foods). Is having a lot of difficulty with bruising and some scaring to abdomen where she usually takes injections. Educated on alternate injection sites and site rotation. Discussed would benefit from adding correction to Humalog before meal if BG is high. Reviewed taking set doses of Levemir BID about 12 hours apart. Her long-acting and rapid-acting are not balanced - she is scheduled to see endocrinologist later this month. Encouraged pt to bring log of BG and insulin doses for more effective insulin adjustment. Pt verbalized interest in insulin pump therapy  - discussed working on basic skills necessary (monitoring, taking insulin consistently, carb counting).     12/15/22 - Pt reports has been taking Humalog and Levemir as prescribed with changes made per Dr. Molina. No logs today. Reports better glucose readings but still a lot of variability. Discussed will be able to dose insulin based on CGM readings and will aid in more effective insulin dose changes.     12/30/22 - Taking Levemir 58 units and Humalog 10 units + correction scale. Reports consistently waking up >/= 160s. Has not increased Levemir as indicated in Dr. Molina's AVS. Encouraged increasing Levemir by 2 units if FBG in am is remaining >140 for 3-5 days. Pt is interested in working toward insulin pump therapy. Briefly discussed basic concepts of pump therapy and how pump works. Explained next step would be to work on CHO counting.     1/26/23 - Pt doing well with taking medications at proper timing and consistently. Lost correction scale for Humalog. Reviewed adding correction based on scale to Humalog before each meal. Pt also recently saw Dr. Perez and Ozempic dose increased. Will take first 1mg dosage this Saturday.     Pt is interested in and working toward pump therapy. Today, discussed in detail and provided demonstration of 2 pumps that are compatible with her Dexcom G6 - TSlim with Control IQ and OmniPod5. Discussed and demonstrated differences (tubing vs pod), benefits of each and both being hybrid closed loop systems. Pt verbalized preference of tubingless system. Encouraged pt to call insurance and discuss coverage and any copay costs she would have.     3/8/23 - Missing some doses of Humalog at times. Reviewed timing, dosage, and using correction scale. Also - discussed hypo event from last week - Pt reports she had taken Humalog before going out to eat Sunday evening - ate large meal containing carb (fried crabs, shrimp, fried rice, and a dessert). BG stayed high for a while after and pt  took additional Humalog about 1-2 hours after meal. BG came down some initially but then bottomed out later. Explained taking extra dose of Humalog was the cause of the hypo event. Stressed only take her Humalog before eating a meal and do not take extra after.    4/12/23 - Pt reports consistently taking MDI, ozempic and metformin as prescribed. Glycemic control much better than it had been in the past; however, avg glucose remaining high and trend remaining around 200. Pt feels diet indiscretion over past week with multiple birthday celebrations/daiquiris/etc is the cause. Pt had previously indicated interest in insulin pump therapy - today asked if this is still something pt is interested in. She voiced uncertain she will like adding another device to wear but really likes the idea of fewer needle sticks. Reviewed basic overview of pump therapy expectations. Pt is willing to work on basic carb counting.        Goal: Pt will titrate long-acting insulin as instructed by MD.    Start Date: 5/24/2023   Expected End Date: 6/29/2023   Priority: High   Barriers: No Barriers Identified   Note:    5/24/23 - Dexcom report shows avg  and 44% time in range. No hypoglycemia. Not having a lot of prandial spikes. Pt has continued with same insulin dosages. Reviewed titration instructions per Dr. Molina - increase by 2 units if FBG remaining >140 over 3-5 days. Pt needs endocrine follow-up appt since Dr. Molina will be leaving in near future - contacted and pt getting scheduled with Prudence Ramirez NP.        Task: Reviewed with patient all current diabetes medications and provided basic review of the purpose, dosage, frequency, side effects, and storage of both oral and injectable diabetes medications. Completed 5/30/2023        Task: Discussed guidelines for preventing, detecting and treating hypoglycemia and hyperglycemia and reviewed the importance of meal and medication timing with diabetes mediations for prevention of  hypoglycemia and maximum drug benefit. Completed 5/30/2023        Problem: Healthy Eating         Goal: Pt will identify foods that are carbohydrates and use labels for counting carbs.    Start Date: 12/30/2022   Expected End Date: 1/26/2023   This Visit's Progress: No change   Recent Progress: No change   Priority: Medium   Barriers: No Barriers Identified   Note:    12/30/22 - Pt is interested in working toward insulin pump therapy. Discussed importance of learning to count carbohydrates in order to prepare for pump. Pt is willing to try. Provided basic carb counting education:    Ed on what carbohydrates are, what foods contain carbs and importance of reducing added sugar  Began ed on appropriate portion sizes of carbs for ~15g CHO  Label reading exercise demonstrating using serving size and total CHO grams in carb count  Began ed on the plate method: importance of increasing non-starchy veggies (cooked until soft d/t gastroparesis), choosing lean protein, healthy fats and portioned servings of complex cho's     Provided food log and encouraged practicing carb counting. Pt will write down foods, approx serving size and grams carb and bring log to next appt.     1/26/23 - Pt hasn't been practicing carb counting since last visit - attention has been focused on assisting Vianca's  since her passing. Has been getting more comfortable with identifying what foods are carbs and using plate method (pt shared photo of her plate with baked chicken, broccoli and ~1 cup potatoes - perfect!). Appropriately estimated carbs as 30g. Reviewed basic carb counting, portion sizes for ~15g CHO, and encouraged downloading/using [employer] imelda for looking up carb grams.     3/8/23 - Reports she has not been carb counting but has been doing well with controlling portions of carb.     4/12/23 - Still has not been practicing carb counting. Reviewed today foods that count as carbs, serving sizes for ~15g, label reading, using imelda  for looking up foods she is unsure of and adding up carbs at meals. Also, pt continues to have some problems with gastroparesis s/s - reviewed gastroparesis nutrition therapy guidelines.     5/24/23 - Still not counting carbs but doing much better with portion control. She is balancing meals with plate method.          Follow Up Plan     Follow up in about 8 weeks (around 7/19/2023) for dexcom report review/follow-up.    Today's care plan and follow up schedule was discussed with patient.  Starla SHANKAR verbalized understanding of the care plan, goals, and agrees to follow up plan.        The patient was encouraged to communicate with his/her health care provider/physician and care team regarding his/her condition(s) and treatment.  I provided the patient with my contact information today and encouraged to contact me via phone or [facility] Patient Portal as needed.     Length of Visit   Total Time: 60 Minutes      yes

## 2024-11-07 NOTE — ED PROVIDER NOTE - PHYSICAL EXAMINATION
***GEN - NAD; well appearing; A+O x3 ***HEAD - NC/AT ***EYES/NOSE - PERRL, EOMI, mucous membranes moist, no discharge ***THROAT: Oral cavity and pharynx normal. No inflammation, swelling, exudate, or lesions.  ***NECK: Neck supple, non-tender  ***PULMONARY - CTA b/l, symmetric breath sounds. ***CARDIAC -s1s2, RRR, no M,G,R  ***ABDOMEN - +BS, ND, NT, soft  ***BACK - no CVA tenderness, Normal  spine ***EXTREMITIES - symmetric pulses, 2+ dp, capillary refill < 2 seconds***SKIN - no rash or bruising   ***NEUROLOGIC - alert, CN 2-12 intact, NIHSS -1 ( Slight drooping of corner of mouth on left)

## 2024-11-07 NOTE — H&P ADULT - NSHPPHYSICALEXAM_GEN_ALL_CORE
T(C): 36.8 (11-07-24 @ 09:39), Max: 36.8 (11-07-24 @ 09:39)  HR: 77 (11-07-24 @ 09:39) (69 - 97)  BP: 135/88 (11-07-24 @ 09:39) (135/88 - 171/100)  RR: 18 (11-07-24 @ 09:39) (16 - 18)  SpO2: 100% (11-07-24 @ 09:39) (99% - 100%)    CONSTITUTIONAL: Well groomed, no apparent distress  EYES: PERRLA and symmetric, EOMI, No conjunctival or scleral injection, non-icteric  ENMT: Oral mucosa with moist membranes. Normal dentition; no pharyngeal injection or exudates             NECK: Supple, symmetric and without tracheal deviation   RESP: No respiratory distress, no use of accessory muscles; CTA b/l, no WRR  CV: RRR, +S1S2, no MRG; no JVD; no peripheral edema  GI: Soft, NT, ND, no rebound, no guarding; no palpable masses; no hepatosplenomegaly; no hernia palpated  LYMPH: No cervical LAD or tenderness; no axillary LAD or tenderness; no inguinal LAD or tenderness  MSK: Normal gait; No digital clubbing or cyanosis; examination of the (head/neck/spine/ribs/pelvis, RUE, LUE, RLE, LLE) without misalignment,            Normal ROM without pain, no spinal tenderness, normal muscle strength/tone  SKIN: No rashes or ulcers noted; no subcutaneous nodules or induration palpable  NEURO: CN II-XII intact; normal reflexes in upper and lower extremities, sensation intact in upper and lower extremities b/l to light touch   PSYCH: Appropriate insight/judgment; A+O x 3, mood and affect appropriate, recent/remote memory intact

## 2024-11-07 NOTE — ED ADULT NURSE NOTE - NSFALLRISKINTERV_ED_ALL_ED

## 2024-11-07 NOTE — SWALLOW BEDSIDE ASSESSMENT ADULT - COMMENTS
Pt admitted to  with dizziness, HA and left facial droop that are improving. Neuro f/u in progress. This profile is superimposed upon a h/o HLD, HTN, and TIA.

## 2024-11-07 NOTE — H&P ADULT - HISTORY OF PRESENT ILLNESS
58 yo Female presented to the ED with complain of left sided weakness. She woke up 0430 am  with headache and high blood pressure. She reported BP of 160/102 at home. Took tylenol for headache with no relief. She had TIA last year also. She had slight left sided facial droop.  Patient reports generalized weakness, left side worse than right that started while on way to hospital. By the time she arrived in hospital her weakness was improved. No other complain.

## 2024-11-07 NOTE — DISCHARGE NOTE NURSING/CASE MANAGEMENT/SOCIAL WORK - PATIENT PORTAL LINK FT
You can access the FollowMyHealth Patient Portal offered by [facility] by registering at the following website: [email or web address]. By joining [employer]’s FollowMyHealth portal, you will also be able to view your health information using other applications (apps) compatible with our system.

## 2024-11-07 NOTE — H&P ADULT - ASSESSMENT
A/P:    1.  TIA  Acute CVA  -follow clinically with neurochecks in telemetry unit  -MRI brain  -TTE  -BMP, lipid panel, A1C  -follow PT/OT/Speech eval  -follow neurology consult  -follow clinically  -Aspirin, Statin    2,.  HTN  HLD  -losartan  -Statin    3.  Lovenox for DVT ppx    4.  Code status  -Full code

## 2024-11-07 NOTE — SWALLOW BEDSIDE ASSESSMENT ADULT - SWALLOW EVAL: PROGNOSIS
2) On encounter, a slight left lip lag was apparent which does not seem to hinder speech integrity. The pt was alert and interactive. She was able to verbalize during communicative probes without evidence of a primary motor speech or primary linguistic pathology. Pt is communicatively competent and at reported speech-language baseline.

## 2024-11-07 NOTE — PATIENT PROFILE ADULT - FALL HARM RISK - HARM RISK INTERVENTIONS
Assistance with ambulation/Assistance OOB with selected safe patient handling equipment/Communicate Risk of Fall with Harm to all staff/Discuss with provider need for PT consult/Monitor for mental status changes/Monitor gait and stability/Provide patient with walking aids - walker, cane, crutches/Reinforce activity limits and safety measures with patient and family/Reorient to person, place and time as needed/Review medications for side effects contributing to fall risk/Tailored Fall Risk Interventions/Use of alarms - bed, chair and/or voice tab/Visual Cue: Yellow wristband and red socks/Bed in lowest position, wheels locked, appropriate side rails in place/Call bell, personal items and telephone in reach/Instruct patient to call for assistance before getting out of bed or chair/Non-slip footwear when patient is out of bed/[place] to call system/Physically safe environment - no spills, clutter or unnecessary equipment/Purposeful Proactive Rounding/Room/bathroom lighting operational, light cord in reach

## 2024-11-07 NOTE — SWALLOW BEDSIDE ASSESSMENT ADULT - SWALLOW EVAL: DIAGNOSIS
1) Pt exhibits functional Oropharyngeal Swallowing integrity for age. NO behavioral aspiration signs exhibited. No change in O2 sats noted. Odynophagia was denied.

## 2024-11-07 NOTE — ED ADULT NURSE REASSESSMENT NOTE - NS ED NURSE REASSESS COMMENT FT1
pt care assumed from previous RN. pt is A&O x4. ambulated to bathroom with safe and steady gait. assisted in ordering breakfast. pending inpatient bed.

## 2024-11-07 NOTE — SWALLOW BEDSIDE ASSESSMENT ADULT - SLP GENERAL OBSERVATIONS
On encounter, a slight left lip lag was apparent which does not seem to hinder speech integrity. The pt was alert and interactive. She was able to verbalize during communicative probes without evidence of a primary motor speech or primary linguistic pathology. Pt is communicatively competent and at reported speech-language baseline.

## 2024-11-07 NOTE — PHYSICAL THERAPY INITIAL EVALUATION ADULT - GENERAL OBSERVATIONS, REHAB EVAL
Pt was found lying in bed on tele, spouse at bedside, Pt c/o Headache, returned from test now, willing to participate in PT

## 2024-11-07 NOTE — DISCHARGE NOTE NURSING/CASE MANAGEMENT/SOCIAL WORK - FINANCIAL ASSISTANCE
[facility] provides services at a reduced cost to those who are determined to be eligible through [facility]’s financial assistance program. Information regarding [facility]’s financial assistance program can be found by going to [email or web address] or by calling [phone].

## 2024-11-07 NOTE — ED PROVIDER NOTE - OBJECTIVE STATEMENT
59-year-old female with history of TIA presents with left-sided weakness and headache.  Patient states that she had elevated blood pressures the past 2 days and then woke up today with a severe headache.  Came to the ED for evaluation.  While en route to the ER patient developed subjective left-sided weakness.  Denies any slurred speech, numbness or tingling or other symptoms.  No blood thinner use.

## 2024-11-07 NOTE — ED ADULT NURSE NOTE - CHIEF COMPLAINT QUOTE
patient ambulatory to triage complains of waking up at 0430 with headache and high blood pressure. reports BP of 160/102 at home. took tylenol for headache with no relief. takes losartan daily. reports history of TIA last year. alert and oriented at triage, answering questions appropriately. slight left sided facial droop , patient reports generalized weakness, left side worse than right that started while on way to hospital. MD samayoa at triage, code stroke called at 0546.

## 2024-11-07 NOTE — SWALLOW BEDSIDE ASSESSMENT ADULT - NS SPL SWALLOW CLINIC TRIAL FT
Pt exhibited functional Oropharyngeal Swallowing integrity for age. NO behavioral aspiration signs exhibited. No change in O2 sats noted. Odynophagia was denied.

## 2024-11-07 NOTE — ED ADULT NURSE NOTE - OBJECTIVE STATEMENT
Pt presents to ed c/o of headache. At triage code stroke called. Pt reports headache started at 4:30 this morning with nausea and dizziness, no episodes of vomiting. PMHx of a TIA. Neuro deficit observed, facial asymmetry drooping on the right side, left sided drift/weakness.

## 2024-11-07 NOTE — SWALLOW BEDSIDE ASSESSMENT ADULT - ASR SWALLOW LABIAL MOBILITY
A slight left lift lag was apparent but labial strength was functional for speech/deglutition nonetheless.

## 2024-11-07 NOTE — ED PROVIDER NOTE - CLINICAL SUMMARY MEDICAL DECISION MAKING FREE TEXT BOX
59-year-old female with left-sided weakness and history of TIA.  Code stroke activated upon arrival.  CT imaging and labs ordered and consult placed with telestroke neurologist .  Patient with NIH stroke scale of 1 and nondisabling symptoms.  Not a TNK candidate.  All imaging obtained in ED negative for acute findings.   aspirin ordered. Patient admitted for neurology evaluation and MRI brain.   Signout given to hospitalist for admission.  Neurology consult placed.

## 2024-11-08 ENCOUNTER — TRANSCRIPTION ENCOUNTER (OUTPATIENT)
Age: 59
End: 2024-11-08

## 2024-11-08 VITALS
SYSTOLIC BLOOD PRESSURE: 124 MMHG | RESPIRATION RATE: 18 BRPM | OXYGEN SATURATION: 97 % | DIASTOLIC BLOOD PRESSURE: 67 MMHG | HEART RATE: 86 BPM | TEMPERATURE: 99 F

## 2024-11-08 LAB
A1C WITH ESTIMATED AVERAGE GLUCOSE RESULT: 5.1 % — SIGNIFICANT CHANGE UP (ref 4–5.6)
ANION GAP SERPL CALC-SCNC: 3 MMOL/L — LOW (ref 5–17)
BUN SERPL-MCNC: 18 MG/DL — SIGNIFICANT CHANGE UP (ref 7–23)
CALCIUM SERPL-MCNC: 9.4 MG/DL — SIGNIFICANT CHANGE UP (ref 8.5–10.1)
CHLORIDE SERPL-SCNC: 108 MMOL/L — SIGNIFICANT CHANGE UP (ref 96–108)
CHOLEST SERPL-MCNC: 150 MG/DL — SIGNIFICANT CHANGE UP
CO2 SERPL-SCNC: 30 MMOL/L — SIGNIFICANT CHANGE UP (ref 22–31)
CREAT SERPL-MCNC: 0.56 MG/DL — SIGNIFICANT CHANGE UP (ref 0.5–1.3)
EGFR: 104 ML/MIN/1.73M2 — SIGNIFICANT CHANGE UP
ESTIMATED AVERAGE GLUCOSE: 100 MG/DL — SIGNIFICANT CHANGE UP (ref 68–114)
GLUCOSE SERPL-MCNC: 116 MG/DL — HIGH (ref 70–99)
HDLC SERPL-MCNC: 53 MG/DL — SIGNIFICANT CHANGE UP
LIPID PNL WITH DIRECT LDL SERPL: 81 MG/DL — SIGNIFICANT CHANGE UP
NON HDL CHOLESTEROL: 97 MG/DL — SIGNIFICANT CHANGE UP
POTASSIUM SERPL-MCNC: 3.6 MMOL/L — SIGNIFICANT CHANGE UP (ref 3.5–5.3)
POTASSIUM SERPL-SCNC: 3.6 MMOL/L — SIGNIFICANT CHANGE UP (ref 3.5–5.3)
SODIUM SERPL-SCNC: 141 MMOL/L — SIGNIFICANT CHANGE UP (ref 135–145)
TRIGL SERPL-MCNC: 84 MG/DL — SIGNIFICANT CHANGE UP

## 2024-11-08 PROCEDURE — 99239 HOSP IP/OBS DSCHRG MGMT >30: CPT

## 2024-11-08 PROCEDURE — 99232 SBSQ HOSP IP/OBS MODERATE 35: CPT

## 2024-11-08 RX ADMIN — PANTOPRAZOLE SODIUM 40 MILLIGRAM(S): 40 TABLET, DELAYED RELEASE ORAL at 05:41

## 2024-11-08 RX ADMIN — Medication 81 MILLIGRAM(S): at 09:17

## 2024-11-08 RX ADMIN — Medication 650 MILLIGRAM(S): at 05:41

## 2024-11-08 RX ADMIN — Medication 40 MILLIGRAM(S): at 09:17

## 2024-11-08 NOTE — PROGRESS NOTE ADULT - SUBJECTIVE AND OBJECTIVE BOX
HPI:  60 yo RH woman presented to the ED with complain of headache, left sided weakness. Better this AM.      ROS:     MEDICATIONS  (STANDING):  aspirin enteric coated 81 milliGRAM(s) Oral daily  atorvastatin 80 milliGRAM(s) Oral at bedtime  enoxaparin Injectable 40 milliGRAM(s) SubCutaneous every 24 hours  losartan 25 milliGRAM(s) Oral daily  pantoprazole    Tablet 40 milliGRAM(s) Oral before breakfast    MEDICATIONS  (PRN):  acetaminophen     Tablet .. 650 milliGRAM(s) Oral every 6 hours PRN Temp greater or equal to 38C (100.4F), Mild Pain (1 - 3)  ondansetron Injectable 4 milliGRAM(s) IV Push every 6 hours PRN Nausea and/or Vomiting  zolpidem 5 milliGRAM(s) Oral at bedtime PRN Insomnia      Vital Signs Last 24 Hrs  T(C): 37 (08 Nov 2024 08:35), Max: 37 (08 Nov 2024 08:35)  T(F): 98.6 (08 Nov 2024 08:35), Max: 98.6 (08 Nov 2024 08:35)  HR: 86 (08 Nov 2024 08:35) (75 - 86)  BP: 124/67 (08 Nov 2024 08:35) (110/76 - 134/71)  BP(mean): --  RR: 18 (08 Nov 2024 08:35) (18 - 18)  SpO2: 97% (08 Nov 2024 08:35) (97% - 100%)    Parameters below as of 08 Nov 2024 08:35  Patient On (Oxygen Delivery Method): room air      Neurological Exam:    HF: Patient is alert and oriented x 3. There is no aphasia or dysarthria. Follows complex commands.     CN: Vision is intact to confrontation. Pupils are equal and reactive. Extra ocular muscles are intact. There is no facial droop or asymmetry. Tongue is midline. Sensation is intact in the face. Other CN II-XII are intact.     Motor: motor examination all muscles are 5/5 and there is no pronator drift.     Sensory: intact to pinprick and touch. VS intact    DTR: 2/4 all 4 extremities. Babinski is negative bilateral.    Co-ord:  Finger to finger to nose is intact. Heel to shin is intact bilaterally.     Gait/balance: Patient ambulates without difficulty.       < from: MR Head No Cont (11.07.24 @ 20:26) >    FINDINGS:  There is no evidence of acute infarct, acute intracranial hemorrhage,   mass effect or hydrocephalus. No extra-axial collection. Basal cisterns   are patent.    Age-related involutional changes and chronic microvascular ischemic   changes.    Ventricles and sulci are age-appropriate in size.    Major intracranial flow-voids are preserved.    The orbits, sellar and suprasellar structures, and craniocervical   junction are unremarkable. Visualized paranasal sinusesand mastoid air   cells are clear.    IMPRESSION:  No acute infarct, acute intracranial hemorrhage, or mass effect.    < end of copied text >      < from: CT Brain Perfusion Maps Stroke (11.07.24 @ 06:09) >  IMPRESSION:    CT HEAD:  No acute intracranial hemorrhage or mass effect.    Findings were discussed with Dr. ARMINDA CERNA [phone] 11/7/2024   6:17 AM by Dr. Ko with read back confirmation.    CT PERFUSION:  Technical limitations: None.    Core infarction: 0 ml  Penumbra / tissue at risk for active ischemia: 0 ml    CTA NECK:  No evidence of significant stenosis or occlusion.    CTA HEAD:  No large vessel occlusion, significant stenosis or vascular abnormality   identified.    < end of copied text >      < from: TTE W or WO Ultrasound Enhancing Agent (11.07.24 @ 10:38) >     CONCLUSIONS:      1. Left ventricular systolic function is normal with an ejection fraction of 59 %   2. Normal right ventricular cavity size and function.   3. Agitated saline injection was negative for intracardiac shunt.    < end of copied text >

## 2024-11-08 NOTE — PROGRESS NOTE ADULT - ASSESSMENT
59 yr old woman c/o headache, transient numbness. non focal exam. Ct head, CT angios negative, MR head negative. Likely migraine with aura, unlikely TIA, no evidence for CVA.  Suggest:  nortriptyline 10 mg po hs  Ubrelvy 100 mg po QD, PRN headache, can repeat in 2 hrs. max 200 mg per day.  Can f/u with my offcie once D/C    Discussed with Dr. Hall

## 2024-11-08 NOTE — DISCHARGE NOTE PROVIDER - PROVIDER TOKENS
PROVIDER:[TOKEN:[[id number]],FOLLOWUP:[1 week],ESTABLISHEDPATIENT:[T]],PROVIDER:[TOKEN:[[id number]],FOLLOWUP:[2 weeks],ESTABLISHEDPATIENT:[T]]

## 2024-11-08 NOTE — DISCHARGE NOTE PROVIDER - CARE PROVIDERS DIRECT ADDRESSES
[email or web address]

## 2024-11-08 NOTE — DISCHARGE NOTE PROVIDER - CARE PROVIDER_API CALL
Horacio Duval  Internal Medicine  [address], [address]  [place] [address]  Phone: [phone]  Fax: [phone]  Established Patient  Follow Up Time: 1 week    Oscar Bright  Neurology  [address] [place] [address], [address]  [place] [address]  Phone: [phone]  Fax: [phone]  Established Patient  Follow Up Time: 2 weeks

## 2024-11-08 NOTE — DISCHARGE NOTE PROVIDER - NSDCMRMEDTOKEN_GEN_ALL_CORE_FT
atorvastatin 80 mg oral tablet: 1 tab(s) orally once a day (at bedtime)  losartan 25 mg oral tablet: 1 tab(s) orally once a day  omeprazole 20 mg oral delayed release tablet: 1 tab(s) orally once a day  zolpidem 5 mg oral tablet: 1 tab(s) orally once a day (at bedtime) as needed for sleep

## 2024-11-08 NOTE — DISCHARGE NOTE PROVIDER - NSDCCPCAREPLAN_GEN_ALL_CORE_FT
PRINCIPAL DISCHARGE DIAGNOSIS  Diagnosis: Migraine headache  Assessment and Plan of Treatment: Your symptoms are likey due to atypical migraine headache. Your CT and MRI was negative for stroke. Please follow up with your PCP and Neurologist soon after discharge

## 2024-11-08 NOTE — OCCUPATIONAL THERAPY INITIAL EVALUATION ADULT - PERTINENT HX OF CURRENT PROBLEM, REHAB EVAL
58 yo Female presented to the ED with complain of left sided weakness. She woke up 0430 am  with headache and high blood pressure. She reported BP of 160/102 at home. Took tylenol  for headache with no relief. She had TIA last year also. She had slight left sided facial droop.  Patient reports generalized weakness, left side worse than right that started while on way to hospital. By the time she arrived in hospital her weakness was improved. But still feels left side weaker than rt

## 2024-11-08 NOTE — DISCHARGE NOTE PROVIDER - HOSPITAL COURSE
60 yo Female presented to the ED with complain of left sided weakness. She woke up 0430 am  with headache and high blood pressure. She reported BP of 160/102 at home. Took tylenol for headache with no relief. She had TIA last year also. She had slight left sided facial droop.  Patient reports generalized weakness, left side worse than right that started while on way to hospital. By the time she arrived in hospital her weakness was improved. No other complain.     Sub: Pt seen and evaluated. Currently, has no acute complaints. At baseline. No other events.  S/p MRI negative for stroke.    Vitals  T(F): 98.6 (11-08-24 @ 08:35), Max: 98.6 (11-08-24 @ 08:35)  HR: 86 (11-08-24 @ 08:35) (75 - 86)  BP: 124/67 (11-08-24 @ 08:35) (110/76 - 134/71)  RR: 18 (11-08-24 @ 08:35) (18 - 18)  SpO2: 97% (11-08-24 @ 08:35) (97% - 100%)    PHYSICAL EXAM   Gen: NAD, comfortable, AA&Ox4  HEENT: head atrumatic and normocephalic, PEERLA, EOMI,  no gross abnormalities of ears, mucous membranes moist, no oral lesions, neck supple without masses/goiter/lymphadenopathy, no JVD  CVS: +s1, s2, regular rate and rhythm, no murmurs, rubs or gallops, no thrill, normal PMI  Pulmonary: normal respiratory effort, clear to auscultation b/l, no wheezes/crackles/rhonchi  Abdomen: soft, non-tender, non-distended, +bowel sounds in all 4 quadrants, no masses noted, no guarding or rigidity   Back: no scoliosis, lordosis, or kyphosis, no point tenderness, no CVA tenderness   Extremities: no pedal edema, pedal pulses palpable, <2 sec. cap refill   Skin: nl warm and dry, no wounds   Neuro: answering questions appropriately, face symmetric, sensation equal bilaterally in face, tongue midline, no dysarthria, 5/5 strength in upper and lower extremities bilaterally, sensation intact in upper and lower extremities bilaterally, nl finger to nose, and nl heel to shin       #Left-sided weakness, headache, likely secondary to atypical migraine, TIA and stroke ruled out  -at baseline  -CT brain, CTA: Negative  -MRI: Negative for acute stroke  -TTE: EF: 59%, negative for intracardiac shunt  -Offered nortriptyline, Ubrelvy: Patient declined  -Seen and eval by neurology  -Patient to follow-up with neurology as outpatient    #HTN, HLD  -Continue with home medications

## 2024-11-15 DIAGNOSIS — G43.909 MIGRAINE, UNSPECIFIED, NOT INTRACTABLE, WITHOUT STATUS MIGRAINOSUS: ICD-10-CM

## 2024-11-15 DIAGNOSIS — I10 ESSENTIAL (PRIMARY) HYPERTENSION: ICD-10-CM

## 2024-11-15 DIAGNOSIS — Z86.73 PERSONAL HISTORY OF TRANSIENT ISCHEMIC ATTACK (TIA), AND CEREBRAL INFARCTION WITHOUT RESIDUAL DEFICITS: ICD-10-CM

## 2024-11-15 DIAGNOSIS — E78.5 HYPERLIPIDEMIA, UNSPECIFIED: ICD-10-CM

## 2024-11-16 ENCOUNTER — OFFICE (OUTPATIENT)
Dept: URBAN - METROPOLITAN AREA CLINIC 12 | Facility: CLINIC | Age: 59
Setting detail: OPHTHALMOLOGY
End: 2024-11-16
Payer: COMMERCIAL

## 2024-11-16 DIAGNOSIS — H01.004: ICD-10-CM

## 2024-11-16 DIAGNOSIS — H35.413: ICD-10-CM

## 2024-11-16 DIAGNOSIS — H01.001: ICD-10-CM

## 2024-11-16 DIAGNOSIS — G45.9: ICD-10-CM

## 2024-11-16 DIAGNOSIS — H01.005: ICD-10-CM

## 2024-11-16 DIAGNOSIS — H01.002: ICD-10-CM

## 2024-11-16 DIAGNOSIS — H25.13: ICD-10-CM

## 2024-11-16 PROCEDURE — 92134 CPTRZ OPH DX IMG PST SGM RTA: CPT | Performed by: OPHTHALMOLOGY

## 2024-11-16 PROCEDURE — 99214 OFFICE O/P EST MOD 30 MIN: CPT | Performed by: OPHTHALMOLOGY

## 2024-11-16 ASSESSMENT — REFRACTION_MANIFEST
OS_VA1: 20/25-1
OD_CYLINDER: -0.75
OS_SPHERE: -5.50
OD_SPHERE: -6.00
OS_SPECTYPE: PROGS
OD_VA1: 20/25-2
OD_AXIS: 018
OS_AXIS: 018
OD_AXIS: 143
OS_VA1: 20/40
OS_ADD: +2.50
OS_AXIS: 023
OS_CYLINDER: -1.50
OS_CYLINDER: -1.75
OS_CYLINDER: -1.25
OD_VA1: 20/NI
OS_SPHERE: -5.25
OD_ADD: +2.75
OD_CYLINDER: -1.25
OD_CYLINDER: -1.00
OS_SPHERE: -5.75
OD_SPECTYPE: PROGS
OS_AXIS: 021
OD_AXIS: 151
OD_SPHERE: -6.00
OD_SPHERE: -7.00

## 2024-11-16 ASSESSMENT — KERATOMETRY
OD_K1POWER_DIOPTERS: 42.75
OS_AXISANGLE_DEGREES: 117
OD_AXISANGLE_DEGREES: 045
OS_K1POWER_DIOPTERS: 42.50
OS_K2POWER_DIOPTERS: 43.25
OD_K2POWER_DIOPTERS: 43.00

## 2024-11-16 ASSESSMENT — REFRACTION_CURRENTRX
OS_ADD: +2.00
OD_VPRISM_DIRECTION: PROGS
OD_CYLINDER: -0.75
OS_SPHERE: -6.25
OS_VPRISM_DIRECTION: PROGS
OD_OVR_VA: 20/
OD_ADD: +2.25
OS_OVR_VA: 20/
OD_SPHERE: -6.50
OS_CYLINDER: -1.50
OS_AXIS: 008
OD_AXIS: 162

## 2024-11-16 ASSESSMENT — REFRACTION_AUTOREFRACTION
OS_SPHERE: -5.25
OS_AXIS: 021
OD_SPHERE: -6.00
OS_CYLINDER: -1.75
OD_CYLINDER: -1.00
OD_AXIS: 143

## 2024-11-16 ASSESSMENT — LID EXAM ASSESSMENTS
OD_BLEPHARITIS: RLL RUL 1+
OS_BLEPHARITIS: LLL LUL 1+

## 2024-11-16 ASSESSMENT — TONOMETRY
OS_IOP_MMHG: 18
OD_IOP_MMHG: 17

## 2024-11-16 ASSESSMENT — VISUAL ACUITY
OD_BCVA: 20/30-2
OS_BCVA: 20/25-2

## 2024-11-16 ASSESSMENT — CONFRONTATIONAL VISUAL FIELD TEST (CVF)
OS_FINDINGS: FULL
OD_FINDINGS: FULL

## 2024-11-21 ENCOUNTER — OFFICE (OUTPATIENT)
Dept: URBAN - METROPOLITAN AREA CLINIC 12 | Facility: CLINIC | Age: 59
Setting detail: OPHTHALMOLOGY
End: 2024-11-21
Payer: COMMERCIAL

## 2024-11-21 DIAGNOSIS — H25.13: ICD-10-CM

## 2024-11-21 DIAGNOSIS — H25.12: ICD-10-CM

## 2024-11-21 PROCEDURE — 92136 OPHTHALMIC BIOMETRY: CPT | Mod: LT | Performed by: OPHTHALMOLOGY

## 2024-11-21 PROCEDURE — 99213 OFFICE O/P EST LOW 20 MIN: CPT | Performed by: OPHTHALMOLOGY

## 2024-11-21 ASSESSMENT — LID EXAM ASSESSMENTS
OS_BLEPHARITIS: LLL LUL 1+
OD_BLEPHARITIS: RLL RUL 1+

## 2024-11-21 ASSESSMENT — CONFRONTATIONAL VISUAL FIELD TEST (CVF)
OS_FINDINGS: FULL
OD_FINDINGS: FULL

## 2024-11-21 ASSESSMENT — TONOMETRY
OD_IOP_MMHG: 17
OS_IOP_MMHG: 20

## 2024-11-22 PROBLEM — H25.11 CATARACT; RIGHT EYE, LEFT EYE, BOTH EYES: Status: ACTIVE | Noted: 2024-11-21

## 2024-11-22 PROBLEM — H25.12 CATARACT; RIGHT EYE, LEFT EYE, BOTH EYES: Status: ACTIVE | Noted: 2024-11-21

## 2024-11-22 PROBLEM — H25.13 CATARACT; RIGHT EYE, LEFT EYE, BOTH EYES: Status: ACTIVE | Noted: 2024-11-21

## 2024-11-22 ASSESSMENT — REFRACTION_MANIFEST
OS_AXIS: 018
OS_CYLINDER: -1.75
OS_SPHERE: -5.50
OD_SPHERE: -6.00
OD_AXIS: 018
OD_SPHERE: -7.00
OD_ADD: +2.75
OD_CYLINDER: -0.75
OD_AXIS: 151
OD_CYLINDER: -1.00
OS_AXIS: 023
OD_VA1: 20/25-2
OS_AXIS: 021
OS_SPHERE: -5.25
OD_SPECTYPE: PROGS
OD_AXIS: 143
OS_VA1: 20/25-1
OS_SPECTYPE: PROGS
OS_CYLINDER: -1.25
OS_CYLINDER: -1.50
OD_VA1: 20/NI
OS_SPHERE: -5.75
OS_ADD: +2.50
OD_CYLINDER: -1.25
OD_SPHERE: -6.00
OS_VA1: 20/40

## 2024-11-22 ASSESSMENT — REFRACTION_CURRENTRX
OD_OVR_VA: 20/
OS_CYLINDER: -1.50
OS_AXIS: 008
OD_CYLINDER: -0.75
OS_SPHERE: -6.25
OD_AXIS: 162
OS_VPRISM_DIRECTION: PROGS
OS_OVR_VA: 20/
OD_VPRISM_DIRECTION: PROGS
OD_SPHERE: -6.50
OD_ADD: +2.25
OS_ADD: +2.00

## 2024-11-22 ASSESSMENT — REFRACTION_AUTOREFRACTION
OD_CYLINDER: -0.75
OD_AXIS: 133
OS_CYLINDER: -1.75
OD_SPHERE: -6.25
OS_AXIS: 036
OS_SPHERE: -5.25

## 2024-11-22 ASSESSMENT — VISUAL ACUITY
OD_BCVA: 20/30-2
OS_BCVA: 20/30-2

## 2024-11-22 ASSESSMENT — KERATOMETRY
OS_K2POWER_DIOPTERS: 43.00
OD_K2POWER_DIOPTERS: 43.25
OD_AXISANGLE_DEGREES: 037
OS_AXISANGLE_DEGREES: 128
OS_K1POWER_DIOPTERS: 42.25
OD_K1POWER_DIOPTERS: 42.25

## 2024-12-06 ENCOUNTER — ASC (OUTPATIENT)
Dept: URBAN - METROPOLITAN AREA SURGERY 8 | Facility: SURGERY | Age: 59
Setting detail: OPHTHALMOLOGY
End: 2024-12-06
Payer: COMMERCIAL

## 2024-12-06 DIAGNOSIS — H25.12: ICD-10-CM

## 2024-12-06 DIAGNOSIS — H52.212: ICD-10-CM

## 2024-12-06 PROCEDURE — 66984 XCAPSL CTRC RMVL W/O ECP: CPT | Mod: LT | Performed by: OPHTHALMOLOGY

## 2024-12-06 PROCEDURE — FEMTO FEMTOSECOND LASER: Mod: GY,LT | Performed by: OPHTHALMOLOGY

## 2024-12-07 ENCOUNTER — RX ONLY (RX ONLY)
Age: 59
End: 2024-12-07

## 2024-12-07 ENCOUNTER — OFFICE (OUTPATIENT)
Dept: URBAN - METROPOLITAN AREA CLINIC 12 | Facility: CLINIC | Age: 59
Setting detail: OPHTHALMOLOGY
End: 2024-12-07
Payer: COMMERCIAL

## 2024-12-07 DIAGNOSIS — H11.32: ICD-10-CM

## 2024-12-07 DIAGNOSIS — Z96.1: ICD-10-CM

## 2024-12-07 PROCEDURE — 99024 POSTOP FOLLOW-UP VISIT: CPT | Performed by: OPTOMETRIST

## 2024-12-07 ASSESSMENT — KERATOMETRY
OD_K1POWER_DIOPTERS: 42.50
OS_K1POWER_DIOPTERS: 42.50
OS_K2POWER_DIOPTERS: 43.00
OD_K2POWER_DIOPTERS: 43.00
OS_AXISANGLE_DEGREES: 098
OD_AXISANGLE_DEGREES: 039

## 2024-12-07 ASSESSMENT — REFRACTION_MANIFEST
OD_AXIS: 151
OD_SPHERE: -6.00
OS_AXIS: 018
OD_AXIS: 018
OD_AXIS: 143
OS_VA1: 20/25-1
OS_SPHERE: -5.75
OD_CYLINDER: -1.00
OD_VA1: 20/25-2
OS_CYLINDER: -1.75
OS_CYLINDER: -1.25
OD_SPHERE: -6.00
OS_SPHERE: -5.25
OD_CYLINDER: -1.25
OD_SPHERE: -7.00
OS_ADD: +2.50
OS_AXIS: 021
OD_VA1: 20/NI
OD_CYLINDER: -0.75
OS_SPHERE: -5.50
OS_SPECTYPE: PROGS
OS_VA1: 20/40
OD_SPECTYPE: PROGS
OD_ADD: +2.75
OS_CYLINDER: -1.50
OS_AXIS: 023

## 2024-12-07 ASSESSMENT — REFRACTION_AUTOREFRACTION
OS_CYLINDER: -1.00
OS_SPHERE: 0.00
OD_AXIS: 144
OD_CYLINDER: -1.00
OD_SPHERE: -6.00
OS_AXIS: 004

## 2024-12-07 ASSESSMENT — VISUAL ACUITY
OS_BCVA: 20/30-2
OD_BCVA: 20/40

## 2024-12-07 ASSESSMENT — CONFRONTATIONAL VISUAL FIELD TEST (CVF)
OS_FINDINGS: FULL
OD_FINDINGS: FULL

## 2024-12-07 ASSESSMENT — REFRACTION_CURRENTRX
OD_VPRISM_DIRECTION: PROGS
OD_CYLINDER: -0.75
OS_CYLINDER: -1.50
OD_AXIS: 162
OD_SPHERE: -6.50
OS_SPHERE: -6.25
OS_OVR_VA: 20/
OD_OVR_VA: 20/
OS_VPRISM_DIRECTION: PROGS
OD_ADD: +2.25
OS_AXIS: 008
OS_ADD: +2.00

## 2024-12-07 ASSESSMENT — TONOMETRY: OD_IOP_MMHG: 17

## 2024-12-07 ASSESSMENT — LID EXAM ASSESSMENTS
OD_BLEPHARITIS: RLL RUL 1+
OS_BLEPHARITIS: LLL LUL 1+

## 2024-12-12 ENCOUNTER — OFFICE (OUTPATIENT)
Dept: URBAN - METROPOLITAN AREA CLINIC 12 | Facility: CLINIC | Age: 59
Setting detail: OPHTHALMOLOGY
End: 2024-12-12
Payer: COMMERCIAL

## 2024-12-12 DIAGNOSIS — Z96.1: ICD-10-CM

## 2024-12-12 DIAGNOSIS — H25.11: ICD-10-CM

## 2024-12-12 PROCEDURE — 92136 OPHTHALMIC BIOMETRY: CPT | Mod: RT | Performed by: OPHTHALMOLOGY

## 2024-12-12 ASSESSMENT — REFRACTION_MANIFEST
OS_VA1: 20/25-1
OD_ADD: +2.75
OD_SPHERE: -6.00
OS_SPHERE: -5.25
OS_CYLINDER: -1.75
OS_AXIS: 023
OD_AXIS: 143
OD_CYLINDER: -0.75
OS_VA1: 20/40
OS_SPHERE: -5.75
OS_SPHERE: -5.50
OD_AXIS: 151
OD_AXIS: 018
OD_SPECTYPE: PROGS
OD_VA1: 20/25-2
OS_CYLINDER: -1.25
OD_SPHERE: -7.00
OD_CYLINDER: -1.25
OS_ADD: +2.50
OD_VA1: 20/NI
OD_SPHERE: -6.00
OD_CYLINDER: -1.00
OS_AXIS: 018
OS_SPECTYPE: PROGS
OS_AXIS: 021
OS_CYLINDER: -1.50

## 2024-12-12 ASSESSMENT — TONOMETRY
OS_IOP_MMHG: 17
OD_IOP_MMHG: 15

## 2024-12-12 ASSESSMENT — REFRACTION_AUTOREFRACTION
OS_AXIS: 029
OD_SPHERE: -6.25
OS_CYLINDER: -0.75
OS_SPHERE: PLANO
OD_CYLINDER: -1.00
OD_AXIS: 140

## 2024-12-12 ASSESSMENT — REFRACTION_CURRENTRX
OD_SPHERE: -6.50
OS_VPRISM_DIRECTION: PROGS
OD_AXIS: 162
OS_AXIS: 008
OS_SPHERE: -6.25
OS_OVR_VA: 20/
OS_CYLINDER: -1.50
OS_ADD: +2.00
OD_CYLINDER: -0.75
OD_VPRISM_DIRECTION: PROGS
OD_ADD: +2.25
OD_OVR_VA: 20/

## 2024-12-12 ASSESSMENT — KERATOMETRY
OS_K2POWER_DIOPTERS: 4.75
OD_K2POWER_DIOPTERS: 43.25
OD_K1POWER_DIOPTERS: 42.75
OS_AXISANGLE_DEGREES: 090
OD_AXISANGLE_DEGREES: 043
OS_K1POWER_DIOPTERS: 42.75

## 2024-12-12 ASSESSMENT — LID EXAM ASSESSMENTS
OS_BLEPHARITIS: LLL LUL 1+
OD_BLEPHARITIS: RLL RUL 1+

## 2024-12-12 ASSESSMENT — VISUAL ACUITY
OD_BCVA: 20/30-2
OS_BCVA: 20/40-

## 2024-12-12 ASSESSMENT — CONFRONTATIONAL VISUAL FIELD TEST (CVF)
OS_FINDINGS: FULL
OD_FINDINGS: FULL

## 2024-12-17 ENCOUNTER — ASC (OUTPATIENT)
Dept: URBAN - METROPOLITAN AREA SURGERY 8 | Facility: SURGERY | Age: 59
Setting detail: OPHTHALMOLOGY
End: 2024-12-17
Payer: COMMERCIAL

## 2024-12-17 DIAGNOSIS — H25.11: ICD-10-CM

## 2024-12-17 PROCEDURE — 66984 XCAPSL CTRC RMVL W/O ECP: CPT | Mod: 79,RT | Performed by: OPHTHALMOLOGY

## 2024-12-17 PROCEDURE — FEMTO PRECISION LASER CATARACT SURGERY: Mod: GY | Performed by: OPHTHALMOLOGY

## 2024-12-18 ENCOUNTER — OFFICE (OUTPATIENT)
Dept: URBAN - METROPOLITAN AREA CLINIC 12 | Facility: CLINIC | Age: 59
Setting detail: OPHTHALMOLOGY
End: 2024-12-18
Payer: COMMERCIAL

## 2024-12-18 DIAGNOSIS — Z96.1: ICD-10-CM

## 2024-12-18 PROCEDURE — 99024 POSTOP FOLLOW-UP VISIT: CPT | Performed by: OPTOMETRIST

## 2024-12-18 ASSESSMENT — REFRACTION_MANIFEST
OS_CYLINDER: -1.25
OD_VA1: 20/25-2
OD_AXIS: 151
OS_SPHERE: -5.75
OD_CYLINDER: -1.25
OD_CYLINDER: -0.75
OD_AXIS: 143
OS_VA1: 20/25-1
OS_SPHERE: -5.25
OD_SPHERE: -6.00
OS_AXIS: 021
OD_SPECTYPE: PROGS
OS_VA1: 20/40
OS_ADD: +2.50
OS_AXIS: 023
OS_SPECTYPE: PROGS
OD_CYLINDER: -1.00
OS_CYLINDER: -1.50
OD_SPHERE: -6.00
OD_VA1: 20/NI
OS_AXIS: 018
OS_CYLINDER: -1.75
OD_SPHERE: -7.00
OS_SPHERE: -5.50
OD_ADD: +2.75
OD_AXIS: 018

## 2024-12-18 ASSESSMENT — REFRACTION_CURRENTRX
OD_SPHERE: -6.50
OS_OVR_VA: 20/
OD_AXIS: 162
OD_ADD: +2.25
OS_SPHERE: -6.25
OS_VPRISM_DIRECTION: PROGS
OS_AXIS: 008
OD_CYLINDER: -0.75
OD_OVR_VA: 20/
OD_VPRISM_DIRECTION: PROGS
OS_ADD: +2.00
OS_CYLINDER: -1.50

## 2024-12-18 ASSESSMENT — REFRACTION_AUTOREFRACTION
OD_SPHERE: PLANO
OS_CYLINDER: -0.50
OD_AXIS: 081
OD_CYLINDER: -0.25
OS_SPHERE: -0.25
OS_AXIS: 027

## 2024-12-18 ASSESSMENT — KERATOMETRY
OD_K1POWER_DIOPTERS: 42.50
OS_K2POWER_DIOPTERS: 43.00
OD_K2POWER_DIOPTERS: 43.00
OS_AXISANGLE_DEGREES: 119
OS_K1POWER_DIOPTERS: 42.75
OD_AXISANGLE_DEGREES: 162

## 2024-12-18 ASSESSMENT — LID EXAM ASSESSMENTS
OS_BLEPHARITIS: LLL LUL 1+
OD_BLEPHARITIS: RLL RUL 1+

## 2024-12-18 ASSESSMENT — TONOMETRY
OD_IOP_MMHG: 18
OS_IOP_MMHG: 18

## 2024-12-18 ASSESSMENT — CONFRONTATIONAL VISUAL FIELD TEST (CVF)
OD_FINDINGS: FULL
OS_FINDINGS: FULL

## 2024-12-18 ASSESSMENT — VISUAL ACUITY
OS_BCVA: 20/20-1
OD_BCVA: 20/30-2

## 2025-01-06 ENCOUNTER — RX ONLY (RX ONLY)
Age: 60
End: 2025-01-06

## 2025-01-06 ENCOUNTER — OFFICE (OUTPATIENT)
Dept: URBAN - METROPOLITAN AREA CLINIC 12 | Facility: CLINIC | Age: 60
Setting detail: OPHTHALMOLOGY
End: 2025-01-06
Payer: COMMERCIAL

## 2025-01-06 DIAGNOSIS — Z96.1: ICD-10-CM

## 2025-01-06 DIAGNOSIS — H16.223: ICD-10-CM

## 2025-01-06 PROCEDURE — 99024 POSTOP FOLLOW-UP VISIT: CPT | Performed by: OPTOMETRIST

## 2025-01-06 ASSESSMENT — REFRACTION_AUTOREFRACTION
OD_AXIS: 096
OS_CYLINDER: -0.75
OS_SPHERE: +0.25
OD_SPHERE: PLANO
OD_CYLINDER: -0.50
OS_AXIS: 043

## 2025-01-06 ASSESSMENT — KERATOMETRY
OD_K2POWER_DIOPTERS: 43.00
OS_AXISANGLE_DEGREES: 139
OD_AXISANGLE_DEGREES: 011
OD_K1POWER_DIOPTERS: 42.50
OS_K2POWER_DIOPTERS: 42.75
OS_K1POWER_DIOPTERS: 42.25

## 2025-01-06 ASSESSMENT — REFRACTION_MANIFEST
OD_VA1: 20/20
OD_CYLINDER: SPH
OS_SPECTYPE: PROGS
OS_SPHERE: -5.50
OS_VA1: 20/20-
OD_CYLINDER: -0.75
OS_CYLINDER: -1.75
OD_CYLINDER: -1.25
OS_CYLINDER: -1.50
OD_SPECTYPE: PROGS
OS_AXIS: 021
OS_AXIS: 018
OD_SPHERE: -6.00
OD_SPHERE: -6.00
OD_AXIS: 018
OD_VA1: 20/NI
OS_SPHERE: -5.25
OS_CYLINDER: -1.25
OS_SPHERE: -5.75
OD_SPHERE: PLANO
OS_SPHERE: PLANO
OD_ADD: +2.75
OD_AXIS: 151
OD_VA1: 20/25-2
OS_VA1: 20/40
OD_SPHERE: -7.00
OD_CYLINDER: -1.00
OS_AXIS: 023
OD_AXIS: 143
OS_VA1: 20/25-1
OS_ADD: +2.50
OS_AXIS: 040
OS_CYLINDER: -0.75

## 2025-01-06 ASSESSMENT — REFRACTION_CURRENTRX
OD_CYLINDER: -0.75
OD_SPHERE: -6.50
OD_AXIS: 162
OS_VPRISM_DIRECTION: PROGS
OS_CYLINDER: -1.50
OS_AXIS: 008
OS_ADD: +2.00
OS_SPHERE: -6.25
OS_OVR_VA: 20/
OD_OVR_VA: 20/
OD_ADD: +2.25
OD_VPRISM_DIRECTION: PROGS

## 2025-01-06 ASSESSMENT — SUPERFICIAL PUNCTATE KERATITIS (SPK)
OD_SPK: T
OS_SPK: T

## 2025-01-06 ASSESSMENT — LID EXAM ASSESSMENTS
OD_BLEPHARITIS: RLL RUL 1+
OS_BLEPHARITIS: LLL LUL 1+

## 2025-01-06 ASSESSMENT — CONFRONTATIONAL VISUAL FIELD TEST (CVF)
OD_FINDINGS: FULL
OS_FINDINGS: FULL

## 2025-01-06 ASSESSMENT — TONOMETRY
OD_IOP_MMHG: 12
OS_IOP_MMHG: 19

## 2025-01-06 ASSESSMENT — VISUAL ACUITY
OS_BCVA: 20/20
OD_BCVA: 20/25

## 2025-01-18 ENCOUNTER — OFFICE (OUTPATIENT)
Dept: URBAN - METROPOLITAN AREA CLINIC 12 | Facility: CLINIC | Age: 60
Setting detail: OPHTHALMOLOGY
End: 2025-01-18
Payer: COMMERCIAL

## 2025-01-18 DIAGNOSIS — Z96.1: ICD-10-CM

## 2025-01-18 DIAGNOSIS — H16.223: ICD-10-CM

## 2025-01-18 DIAGNOSIS — H52.4: ICD-10-CM

## 2025-01-18 DIAGNOSIS — H52.7: ICD-10-CM

## 2025-01-18 PROCEDURE — 92015 DETERMINE REFRACTIVE STATE: CPT | Performed by: OPTOMETRIST

## 2025-01-18 PROCEDURE — 99024 POSTOP FOLLOW-UP VISIT: CPT | Performed by: OPTOMETRIST

## 2025-01-18 ASSESSMENT — REFRACTION_MANIFEST
OS_CYLINDER: -0.75
OS_SPECTYPE: PROGS
OS_AXIS: 040
OS_VA1: 20/20-
OS_VA1: 20/25-1
OD_AXIS: 143
OS_CYLINDER: -0.50
OS_SPHERE: PLANO
OS_SPHERE: -0.25
OS_AXIS: 040
OS_SPHERE: -5.25
OD_AXIS: 018
OS_SPHERE: -5.50
OS_AXIS: 023
OS_VA1: 20/40
OD_VA1: 20/20
OD_CYLINDER: -1.00
OS_CYLINDER: -1.25
OS_CYLINDER: -1.75
OD_ADD: +2.50
OD_SPHERE: -6.00
OD_SPHERE: PLANO
OD_SPHERE: -7.00
OD_CYLINDER: -1.25
OS_AXIS: 018
OS_ADD: +2.50
OD_ADD: +2.75
OD_VA1: 20/20
OS_VA1: 20/20-
OS_CYLINDER: -1.50
OD_VA1: 20/25-2
OD_SPHERE: -0.25
OS_AXIS: 021
OD_SPHERE: -6.00
OD_CYLINDER: SPH
OD_VA1: 20/NI
OD_CYLINDER: -0.75
OD_AXIS: 151
OD_SPECTYPE: PROGS
OS_ADD: +2.50
OD_CYLINDER: SPH
OS_SPHERE: -5.75

## 2025-01-18 ASSESSMENT — REFRACTION_CURRENTRX
OD_SPHERE: -6.50
OS_VPRISM_DIRECTION: PROGS
OD_VPRISM_DIRECTION: PROGS
OS_OVR_VA: 20/
OD_OVR_VA: 20/
OS_CYLINDER: -1.50
OD_ADD: +2.25
OS_ADD: +2.00
OS_AXIS: 008
OS_SPHERE: -6.25
OD_CYLINDER: -0.75
OD_AXIS: 162

## 2025-01-18 ASSESSMENT — CONFRONTATIONAL VISUAL FIELD TEST (CVF)
OS_FINDINGS: FULL
OD_FINDINGS: FULL

## 2025-01-18 ASSESSMENT — REFRACTION_AUTOREFRACTION
OD_AXIS: 0
OD_SPHERE: -0.25
OS_CYLINDER: -0.50
OD_CYLINDER: SPH
OS_AXIS: 37
OS_SPHERE: -0.25

## 2025-01-18 ASSESSMENT — TONOMETRY
OS_IOP_MMHG: 19
OD_IOP_MMHG: 18

## 2025-01-18 ASSESSMENT — LID EXAM ASSESSMENTS
OD_BLEPHARITIS: RLL RUL 1+
OS_BLEPHARITIS: LLL LUL 1+

## 2025-01-18 ASSESSMENT — KERATOMETRY
OD_K1POWER_DIOPTERS: 42.75
OD_K2POWER_DIOPTERS: 43.00
OS_K2POWER_DIOPTERS: 43.00
OS_AXISANGLE_DEGREES: 116
OD_AXISANGLE_DEGREES: 104
OS_K1POWER_DIOPTERS: 42.75

## 2025-01-18 ASSESSMENT — VISUAL ACUITY
OD_BCVA: 20/40
OS_BCVA: 20/25

## 2025-01-18 ASSESSMENT — SUPERFICIAL PUNCTATE KERATITIS (SPK)
OS_SPK: T
OD_SPK: T

## 2025-05-11 ENCOUNTER — OFFICE (OUTPATIENT)
Dept: URBAN - METROPOLITAN AREA CLINIC 12 | Facility: CLINIC | Age: 60
Setting detail: OPHTHALMOLOGY
End: 2025-05-11
Payer: COMMERCIAL

## 2025-05-11 DIAGNOSIS — H01.001: ICD-10-CM

## 2025-05-11 DIAGNOSIS — H16.223: ICD-10-CM

## 2025-05-11 DIAGNOSIS — H35.413: ICD-10-CM

## 2025-05-11 DIAGNOSIS — H01.002: ICD-10-CM

## 2025-05-11 PROBLEM — H26.493 POSTERIOR CAPSULAR OPACIFICATION; BOTH EYES: Status: ACTIVE | Noted: 2025-05-11

## 2025-05-11 PROCEDURE — 92250 FUNDUS PHOTOGRAPHY W/I&R: CPT | Performed by: OPTOMETRIST

## 2025-05-11 PROCEDURE — 92014 COMPRE OPH EXAM EST PT 1/>: CPT | Performed by: OPTOMETRIST

## 2025-05-11 ASSESSMENT — REFRACTION_MANIFEST
OD_SPHERE: -6.00
OS_AXIS: 023
OS_VA1: 20/25-1
OS_VA1: 20/20-
OD_CYLINDER: -1.25
OD_CYLINDER: SPH
OS_SPHERE: PLANO
OD_CYLINDER: -1.00
OS_CYLINDER: -0.75
OS_SPHERE: -0.25
OD_AXIS: 151
OS_AXIS: 018
OS_VA1: 20/40
OS_AXIS: 040
OD_SPHERE: PLANO
OS_AXIS: 040
OD_SPHERE: -7.00
OD_SPHERE: -0.25
OS_CYLINDER: -1.25
OD_VA1: 20/NI
OS_CYLINDER: -1.75
OD_CYLINDER: SPH
OD_ADD: +2.50
OD_SPHERE: -6.00
OD_CYLINDER: -0.75
OD_VA1: 20/20
OS_ADD: +2.50
OS_SPECTYPE: PROGS
OS_CYLINDER: -1.50
OD_VA1: 20/20
OS_VA1: 20/20-
OD_VA1: 20/25-2
OD_SPECTYPE: PROGS
OS_SPHERE: -5.25
OS_SPHERE: -5.75
OS_CYLINDER: -0.50
OD_ADD: +2.75
OD_AXIS: 018
OS_SPHERE: -5.50
OS_ADD: +2.50
OD_AXIS: 143
OS_AXIS: 021

## 2025-05-11 ASSESSMENT — REFRACTION_CURRENTRX
OD_SPHERE: -6.50
OS_CYLINDER: -1.50
OS_SPHERE: -6.25
OS_ADD: +2.00
OS_AXIS: 008
OD_VPRISM_DIRECTION: PROGS
OS_VPRISM_DIRECTION: PROGS
OD_ADD: +2.25
OD_CYLINDER: -0.75
OS_OVR_VA: 20/
OD_AXIS: 162
OD_OVR_VA: 20/

## 2025-05-11 ASSESSMENT — VISUAL ACUITY
OS_BCVA: 20/25-
OD_BCVA: 20/40-2

## 2025-05-11 ASSESSMENT — REFRACTION_AUTOREFRACTION
OD_CYLINDER: -0.50
OS_AXIS: 017
OD_AXIS: 102
OS_SPHERE: -0.50
OD_SPHERE: PLANO
OS_CYLINDER: -0.25

## 2025-05-11 ASSESSMENT — SUPERFICIAL PUNCTATE KERATITIS (SPK)
OD_SPK: T
OS_SPK: T

## 2025-05-11 ASSESSMENT — KERATOMETRY
OD_AXISANGLE_DEGREES: 008
OS_K2POWER_DIOPTERS: 43.00
OS_AXISANGLE_DEGREES: 124
OD_K2POWER_DIOPTERS: 43.00
OD_K1POWER_DIOPTERS: 42.75
OS_K1POWER_DIOPTERS: 42.75

## 2025-05-11 ASSESSMENT — TONOMETRY: OS_IOP_MMHG: 16

## 2025-05-11 ASSESSMENT — LID EXAM ASSESSMENTS
OS_BLEPHARITIS: LLL LUL 1+
OD_BLEPHARITIS: RLL RUL 1+

## 2025-05-11 ASSESSMENT — CONFRONTATIONAL VISUAL FIELD TEST (CVF)
OS_FINDINGS: FULL
OD_FINDINGS: FULL

## 2025-06-17 ENCOUNTER — APPOINTMENT (OUTPATIENT)
Dept: PHYSICAL MEDICINE AND REHAB | Facility: CLINIC | Age: 60
End: 2025-06-17
Payer: COMMERCIAL

## 2025-06-17 VITALS
HEART RATE: 79 BPM | RESPIRATION RATE: 16 BRPM | SYSTOLIC BLOOD PRESSURE: 124 MMHG | HEIGHT: 63 IN | WEIGHT: 153 LBS | OXYGEN SATURATION: 97 % | BODY MASS INDEX: 27.11 KG/M2 | DIASTOLIC BLOOD PRESSURE: 84 MMHG

## 2025-06-17 PROBLEM — M54.16 LUMBAR RADICULAR PAIN: Status: ACTIVE | Noted: 2025-06-17

## 2025-06-17 PROCEDURE — 20553 NJX 1/MLT TRIGGER POINTS 3/>: CPT

## 2025-06-17 PROCEDURE — 99214 OFFICE O/P EST MOD 30 MIN: CPT | Mod: 25

## 2025-06-23 ENCOUNTER — NON-APPOINTMENT (OUTPATIENT)
Age: 60
End: 2025-06-23

## 2025-06-24 ENCOUNTER — APPOINTMENT (OUTPATIENT)
Dept: BREAST CENTER | Facility: CLINIC | Age: 60
End: 2025-06-24
Payer: COMMERCIAL

## 2025-06-24 VITALS
HEIGHT: 63 IN | BODY MASS INDEX: 27.11 KG/M2 | HEART RATE: 67 BPM | WEIGHT: 153 LBS | SYSTOLIC BLOOD PRESSURE: 131 MMHG | DIASTOLIC BLOOD PRESSURE: 82 MMHG

## 2025-06-24 PROCEDURE — 99214 OFFICE O/P EST MOD 30 MIN: CPT

## 2025-06-29 ENCOUNTER — OFFICE (OUTPATIENT)
Dept: URBAN - METROPOLITAN AREA CLINIC 12 | Facility: CLINIC | Age: 60
Setting detail: OPHTHALMOLOGY
End: 2025-06-29
Payer: COMMERCIAL

## 2025-06-29 DIAGNOSIS — Z96.1: ICD-10-CM

## 2025-06-29 DIAGNOSIS — H35.413: ICD-10-CM

## 2025-06-29 DIAGNOSIS — G45.9: ICD-10-CM

## 2025-06-29 DIAGNOSIS — H01.005: ICD-10-CM

## 2025-06-29 DIAGNOSIS — H16.223: ICD-10-CM

## 2025-06-29 DIAGNOSIS — H01.002: ICD-10-CM

## 2025-06-29 DIAGNOSIS — H26.493: ICD-10-CM

## 2025-06-29 DIAGNOSIS — H01.001: ICD-10-CM

## 2025-06-29 DIAGNOSIS — H01.004: ICD-10-CM

## 2025-06-29 PROCEDURE — 99214 OFFICE O/P EST MOD 30 MIN: CPT | Performed by: OPHTHALMOLOGY

## 2025-06-29 ASSESSMENT — REFRACTION_CURRENTRX
OS_SPHERE: -6.25
OS_CYLINDER: -1.50
OD_OVR_VA: 20/
OD_ADD: +2.25
OD_CYLINDER: -0.75
OS_OVR_VA: 20/
OS_VPRISM_DIRECTION: PROGS
OD_AXIS: 162
OS_ADD: +2.00
OD_SPHERE: -6.50
OS_AXIS: 008
OD_VPRISM_DIRECTION: PROGS

## 2025-06-29 ASSESSMENT — REFRACTION_MANIFEST
OD_AXIS: 151
OS_CYLINDER: -0.50
OS_SPHERE: -5.75
OD_SPHERE: PLANO
OS_CYLINDER: -0.75
OD_SPHERE: -0.25
OD_CYLINDER: -1.25
OD_VA1: 20/20
OD_CYLINDER: -0.75
OS_CYLINDER: -1.50
OD_SPHERE: -6.00
OS_AXIS: 040
OS_SPHERE: -0.25
OD_AXIS: 143
OS_CYLINDER: -1.75
OD_ADD: +2.75
OD_VA1: 20/25-2
OS_VA1: 20/40
OD_CYLINDER: SPH
OD_CYLINDER: SPH
OS_SPECTYPE: PROGS
OS_AXIS: 021
OS_CYLINDER: -1.25
OD_VA1: 20/20
OD_SPECTYPE: PROGS
OD_ADD: +2.50
OS_ADD: +2.50
OD_CYLINDER: -1.00
OS_ADD: +2.50
OS_VA1: 20/20-
OD_SPHERE: -7.00
OS_SPHERE: PLANO
OS_AXIS: 023
OS_AXIS: 040
OS_VA1: 20/20-
OS_SPHERE: -5.50
OD_AXIS: 018
OD_VA1: 20/NI
OS_AXIS: 018
OS_SPHERE: -5.25
OS_VA1: 20/25-1
OD_SPHERE: -6.00

## 2025-06-29 ASSESSMENT — SUPERFICIAL PUNCTATE KERATITIS (SPK)
OD_SPK: T
OS_SPK: T

## 2025-06-29 ASSESSMENT — REFRACTION_AUTOREFRACTION
OS_AXIS: 015
OD_SPHERE: PLANO
OD_CYLINDER: -0.50
OD_AXIS: 090
OS_SPHERE: -0.25
OS_CYLINDER: -0.25

## 2025-06-29 ASSESSMENT — KERATOMETRY
OD_K2POWER_DIOPTERS: 43.00
OS_AXISANGLE_DEGREES: 090
OS_K1POWER_DIOPTERS: 42.75
OD_AXISANGLE_DEGREES: 001
OD_K1POWER_DIOPTERS: 42.75
OS_K2POWER_DIOPTERS: 42.75

## 2025-06-29 ASSESSMENT — LID EXAM ASSESSMENTS
OS_BLEPHARITIS: LLL LUL 1+
OD_BLEPHARITIS: RLL RUL 1+

## 2025-06-29 ASSESSMENT — TONOMETRY
OD_IOP_MMHG: 13
OS_IOP_MMHG: 14

## 2025-06-29 ASSESSMENT — CONFRONTATIONAL VISUAL FIELD TEST (CVF)
OD_FINDINGS: FULL
OS_FINDINGS: FULL

## 2025-06-29 ASSESSMENT — VISUAL ACUITY
OS_BCVA: 20/25-2
OD_BCVA: 20/40+

## 2025-07-01 ENCOUNTER — APPOINTMENT (OUTPATIENT)
Dept: PHYSICAL MEDICINE AND REHAB | Facility: CLINIC | Age: 60
End: 2025-07-01

## 2025-07-29 ENCOUNTER — APPOINTMENT (OUTPATIENT)
Dept: PHYSICAL MEDICINE AND REHAB | Facility: CLINIC | Age: 60
End: 2025-07-29
Payer: COMMERCIAL

## 2025-07-29 VITALS
OXYGEN SATURATION: 98 % | SYSTOLIC BLOOD PRESSURE: 147 MMHG | RESPIRATION RATE: 16 BRPM | HEIGHT: 63 IN | WEIGHT: 153 LBS | DIASTOLIC BLOOD PRESSURE: 89 MMHG | BODY MASS INDEX: 27.11 KG/M2 | HEART RATE: 86 BPM

## 2025-07-29 DIAGNOSIS — M25.562 PAIN IN LEFT KNEE: ICD-10-CM

## 2025-07-29 DIAGNOSIS — M47.816 SPONDYLOSIS W/OUT MYELOPATHY OR RADICULOPATHY, LUMBAR REGION: ICD-10-CM

## 2025-07-29 DIAGNOSIS — M47.812 SPONDYLOSIS W/OUT MYELOPATHY OR RADICULOPATHY, CERVICAL REGION: ICD-10-CM

## 2025-07-29 DIAGNOSIS — M17.12 UNILATERAL PRIMARY OSTEOARTHRITIS, LEFT KNEE: ICD-10-CM

## 2025-07-29 PROCEDURE — 20610 DRAIN/INJ JOINT/BURSA W/O US: CPT | Mod: LT

## 2025-07-29 PROCEDURE — 99214 OFFICE O/P EST MOD 30 MIN: CPT | Mod: 25

## 2025-08-01 ENCOUNTER — ASC (OUTPATIENT)
Dept: URBAN - METROPOLITAN AREA SURGERY 8 | Facility: SURGERY | Age: 60
Setting detail: OPHTHALMOLOGY
End: 2025-08-01
Payer: COMMERCIAL

## 2025-08-01 ENCOUNTER — RX ONLY (RX ONLY)
Age: 60
End: 2025-08-01

## 2025-08-01 DIAGNOSIS — H26.492: ICD-10-CM

## 2025-08-01 PROCEDURE — 66821 AFTER CATARACT LASER SURGERY: CPT | Mod: LT | Performed by: OPHTHALMOLOGY

## 2025-08-01 ASSESSMENT — REFRACTION_CURRENTRX
OD_ADD: +2.25
OD_VPRISM_DIRECTION: PROGS
OS_VPRISM_DIRECTION: PROGS
OD_SPHERE: -6.50
OS_AXIS: 008
OS_ADD: +2.00
OS_CYLINDER: -1.50
OD_OVR_VA: 20/
OD_CYLINDER: -0.75
OD_AXIS: 162
OS_SPHERE: -6.25
OS_OVR_VA: 20/

## 2025-08-01 ASSESSMENT — REFRACTION_MANIFEST
OD_SPHERE: -6.00
OS_VA1: 20/20-
OD_AXIS: 151
OD_SPHERE: -0.25
OD_CYLINDER: -1.00
OS_AXIS: 023
OD_ADD: +2.75
OD_VA1: 20/NI
OS_CYLINDER: -1.25
OS_SPHERE: -5.75
OS_AXIS: 040
OD_SPHERE: -6.00
OD_AXIS: 143
OS_CYLINDER: -0.75
OD_SPHERE: PLANO
OD_CYLINDER: SPH
OS_SPHERE: -0.25
OS_AXIS: 018
OD_ADD: +2.50
OD_VA1: 20/20
OS_VA1: 20/20-
OD_VA1: 20/20
OS_SPHERE: PLANO
OS_ADD: +2.50
OS_VA1: 20/40
OD_CYLINDER: -1.25
OS_SPECTYPE: PROGS
OS_VA1: 20/25-1
OS_SPHERE: -5.25
OS_SPHERE: -5.50
OD_VA1: 20/25-2
OS_CYLINDER: -1.50
OS_AXIS: 040
OD_CYLINDER: -0.75
OS_CYLINDER: -0.50
OD_SPECTYPE: PROGS
OS_CYLINDER: -1.75
OS_ADD: +2.50
OD_SPHERE: -7.00
OS_AXIS: 021
OD_CYLINDER: SPH
OD_AXIS: 018

## 2025-08-01 ASSESSMENT — REFRACTION_AUTOREFRACTION
OS_AXIS: 015
OD_SPHERE: PLANO
OD_CYLINDER: -0.50
OD_AXIS: 090
OS_CYLINDER: -0.25
OS_SPHERE: -0.25

## 2025-08-01 ASSESSMENT — CONFRONTATIONAL VISUAL FIELD TEST (CVF)
OD_FINDINGS: FULL
OS_FINDINGS: FULL

## 2025-08-01 ASSESSMENT — VISUAL ACUITY
OS_BCVA: 20/25-2
OD_BCVA: 20/40+

## 2025-08-01 ASSESSMENT — KERATOMETRY
OS_K2POWER_DIOPTERS: 42.75
OD_K1POWER_DIOPTERS: 42.75
OS_K1POWER_DIOPTERS: 42.75
OS_AXISANGLE_DEGREES: 090
OD_K2POWER_DIOPTERS: 43.00
OD_AXISANGLE_DEGREES: 001

## 2025-08-01 ASSESSMENT — SUPERFICIAL PUNCTATE KERATITIS (SPK)
OD_SPK: T
OS_SPK: T

## 2025-08-08 ENCOUNTER — APPOINTMENT (OUTPATIENT)
Dept: PHYSICAL MEDICINE AND REHAB | Facility: CLINIC | Age: 60
End: 2025-08-08